# Patient Record
Sex: FEMALE | Race: BLACK OR AFRICAN AMERICAN
[De-identification: names, ages, dates, MRNs, and addresses within clinical notes are randomized per-mention and may not be internally consistent; named-entity substitution may affect disease eponyms.]

---

## 2017-10-20 ENCOUNTER — HOSPITAL ENCOUNTER (EMERGENCY)
Dept: HOSPITAL 47 - EC | Age: 27
Discharge: HOME | End: 2017-10-20
Payer: COMMERCIAL

## 2017-10-20 VITALS — SYSTOLIC BLOOD PRESSURE: 119 MMHG | HEART RATE: 93 BPM | RESPIRATION RATE: 18 BRPM | DIASTOLIC BLOOD PRESSURE: 89 MMHG

## 2017-10-20 VITALS — TEMPERATURE: 98.3 F

## 2017-10-20 DIAGNOSIS — R41.82: ICD-10-CM

## 2017-10-20 DIAGNOSIS — F10.239: ICD-10-CM

## 2017-10-20 DIAGNOSIS — F17.200: ICD-10-CM

## 2017-10-20 DIAGNOSIS — E86.0: Primary | ICD-10-CM

## 2017-10-20 LAB
ALP SERPL-CCNC: 59 U/L (ref 38–126)
ALT SERPL-CCNC: 92 U/L (ref 9–52)
ANION GAP SERPL CALC-SCNC: 16 MMOL/L
AST SERPL-CCNC: 46 U/L (ref 14–36)
BASOPHILS # BLD AUTO: 0 K/UL (ref 0–0.2)
BASOPHILS NFR BLD AUTO: 0 %
BUN SERPL-SCNC: 11 MG/DL (ref 7–17)
CALCIUM SPEC-MCNC: 9.6 MG/DL (ref 8.4–10.2)
CH: 30.3
CHCM: 31.7
CHLORIDE SERPL-SCNC: 108 MMOL/L (ref 98–107)
CO2 SERPL-SCNC: 21 MMOL/L (ref 22–30)
EOSINOPHIL # BLD AUTO: 0.2 K/UL (ref 0–0.7)
EOSINOPHIL NFR BLD AUTO: 1 %
ERYTHROCYTE [DISTWIDTH] IN BLOOD BY AUTOMATED COUNT: 4.33 M/UL (ref 3.8–5.4)
ERYTHROCYTE [DISTWIDTH] IN BLOOD: 13.5 % (ref 11.5–15.5)
GLUCOSE SERPL-MCNC: 93 MG/DL (ref 74–99)
HCT VFR BLD AUTO: 41.5 % (ref 34–46)
HDW: 2.09
HGB BLD-MCNC: 13.1 GM/DL (ref 11.4–16)
LUC NFR BLD AUTO: 1 %
LYMPHOCYTES # SPEC AUTO: 2.1 K/UL (ref 1–4.8)
LYMPHOCYTES NFR SPEC AUTO: 16 %
MAGNESIUM SPEC-SCNC: 1.7 MG/DL (ref 1.6–2.3)
MCH RBC QN AUTO: 30.2 PG (ref 25–35)
MCHC RBC AUTO-ENTMCNC: 31.5 G/DL (ref 31–37)
MCV RBC AUTO: 95.9 FL (ref 80–100)
MONOCYTES # BLD AUTO: 0.5 K/UL (ref 0–1)
MONOCYTES NFR BLD AUTO: 4 %
NEUTROPHILS # BLD AUTO: 10.1 K/UL (ref 1.3–7.7)
NEUTROPHILS NFR BLD AUTO: 78 %
NON-AFRICAN AMERICAN GFR(MDRD): >60
PHOSPHATE SERPL-MCNC: 4.3 MG/DL (ref 2.5–4.5)
POTASSIUM SERPL-SCNC: 4.5 MMOL/L (ref 3.5–5.1)
PROT SERPL-MCNC: 8 G/DL (ref 6.3–8.2)
SODIUM SERPL-SCNC: 145 MMOL/L (ref 137–145)
WBC # BLD AUTO: 0.11 10*3/UL
WBC # BLD AUTO: 12.9 K/UL (ref 3.8–10.6)
WBC (PEROX): 13.04

## 2017-10-20 PROCEDURE — 93005 ELECTROCARDIOGRAM TRACING: CPT

## 2017-10-20 PROCEDURE — 96374 THER/PROPH/DIAG INJ IV PUSH: CPT

## 2017-10-20 PROCEDURE — 80320 DRUG SCREEN QUANTALCOHOLS: CPT

## 2017-10-20 PROCEDURE — 84703 CHORIONIC GONADOTROPIN ASSAY: CPT

## 2017-10-20 PROCEDURE — 83735 ASSAY OF MAGNESIUM: CPT

## 2017-10-20 PROCEDURE — 96375 TX/PRO/DX INJ NEW DRUG ADDON: CPT

## 2017-10-20 PROCEDURE — 84100 ASSAY OF PHOSPHORUS: CPT

## 2017-10-20 PROCEDURE — 85025 COMPLETE CBC W/AUTO DIFF WBC: CPT

## 2017-10-20 PROCEDURE — 36415 COLL VENOUS BLD VENIPUNCTURE: CPT

## 2017-10-20 PROCEDURE — 96361 HYDRATE IV INFUSION ADD-ON: CPT

## 2017-10-20 PROCEDURE — 99284 EMERGENCY DEPT VISIT MOD MDM: CPT

## 2017-10-20 PROCEDURE — 80053 COMPREHEN METABOLIC PANEL: CPT

## 2017-10-20 NOTE — ED
General Adult HPI





- General


Chief complaint: Syncope


Stated complaint: Fall


Time Seen by Provider: 10/20/17 10:34


Source: RN/MD, RN notes reviewed, old records reviewed


Mode of arrival: wheelchair


Limitations: altered mental status





- History of Present Illness


Initial comments: 





This is a 27-year-old female to the ER for evaluation today.  Patient presents 

with her mother today for evaluation after a syncopal event.  Patient has no 

medical history takes no medications.  Patient states he feels very weak, 

lightheaded.  Patient does admit to drinking last night, more than usual.  No 

nausea vomiting.  Patient states she neck keep anything down today.  No recent 

episodes of illness no diarrhea no chest pain or shortness of breath no 

abdominal pain.  No headaches.  Patient was visiting a friend and OB and while 

in the room passed out.  Witnessed event, no change of color.





- Related Data


 Home Medications











 Medication  Instructions  Recorded  Confirmed


 


Aspirin-Acet-Caff [id number] 2 tab PO Q4HR PRN 10/20/17 10/20/17





[Excedrin]   











 Allergies











Allergy/AdvReac Type Severity Reaction Status Date / Time


 


No Known Allergies Allergy   Verified 10/18/15 07:50














Review of Systems


ROS Statement: 


Those systems with pertinent positive or pertinent negative responses have been 

documented in the HPI.





ROS Other: All systems not noted in ROS Statement are negative.





Past Medical History


Past Medical History: No Reported History


Additional Past Medical History / Comment(s): Pericarditis; Kidney Stones


History of Any Multi-Drug Resistant Organisms: None Reported


Past Surgical History: No Surgical Hx Reported


Past Psychological History: Anxiety, Depression


Smoking Status: Current every day smoker


Past Alcohol Use History: Occasional


Past Drug Use History: None Reported





General Exam


Limitations: altered mental status


General appearance: alert, in no apparent distress


Head exam: Present: atraumatic, normocephalic, normal inspection


Eye exam: Present: normal appearance, PERRL, EOMI.  Absent: scleral icterus, 

conjunctival injection, periorbital swelling


ENT exam: Present: normal exam, mucous membranes moist


Neck exam: Present: normal inspection.  Absent: tenderness, meningismus, 

lymphadenopathy


Respiratory exam: Present: normal lung sounds bilaterally.  Absent: respiratory 

distress, wheezes, rales, rhonchi, stridor


Cardiovascular Exam: Present: regular rate, normal rhythm, normal heart sounds.

  Absent: systolic murmur, diastolic murmur, rubs, gallop, clicks


GI/Abdominal exam: Present: soft, normal bowel sounds.  Absent: distended, 

tenderness, guarding, rebound, rigid


Extremities exam: Present: normal inspection, full ROM, normal capillary 

refill.  Absent: tenderness, pedal edema, joint swelling, calf tenderness


Back exam: Present: normal inspection


Neurological exam: Present: alert, oriented X3, CN II-XII intact


Psychiatric exam: Present: normal affect, normal mood


Skin exam: Present: warm, dry, intact, normal color.  Absent: rash





Course


 Vital Signs











  10/20/17





  10:20


 


Temperature 98.3 F


 


Pulse Rate 92


 


Respiratory 18





Rate 


 


Blood Pressure 112/68


 


O2 Sat by Pulse 99





Oximetry 














- Reevaluation(s)


Reevaluation #1: 





10/20/17 11:07


Patient is feeling much better with IV fluid resuscitation





Medical Decision Making





- Medical Decision Making





27 female to the ER with vasovagal syncopal event, secondary dehydration and 

alcohol intoxication from the night before.  Patient's labwork is normal.  

Patient is doing better patient can be discharged home





Disposition


Clinical Impression: 


 Vasovagal syncope, Dehydration, Alcohol withdrawal





Disposition: HOME SELF-CARE


Condition: Good


Instructions:  Syncope (ED)


Referrals: 


Bennett Saucedo MD [Primary Care Provider] - 1-2 days

## 2019-07-28 ENCOUNTER — HOSPITAL ENCOUNTER (EMERGENCY)
Dept: HOSPITAL 47 - EC | Age: 29
Discharge: HOME | End: 2019-07-28
Payer: COMMERCIAL

## 2019-07-28 VITALS — RESPIRATION RATE: 20 BRPM | HEART RATE: 82 BPM | SYSTOLIC BLOOD PRESSURE: 130 MMHG | DIASTOLIC BLOOD PRESSURE: 83 MMHG

## 2019-07-28 VITALS — TEMPERATURE: 98.1 F

## 2019-07-28 DIAGNOSIS — Z32.02: ICD-10-CM

## 2019-07-28 DIAGNOSIS — F17.200: ICD-10-CM

## 2019-07-28 DIAGNOSIS — R51: Primary | ICD-10-CM

## 2019-07-28 LAB
ALBUMIN SERPL-MCNC: 4.3 G/DL (ref 3.5–5)
ALP SERPL-CCNC: 43 U/L (ref 38–126)
ALT SERPL-CCNC: 29 U/L (ref 9–52)
ANION GAP SERPL CALC-SCNC: 6 MMOL/L
AST SERPL-CCNC: 24 U/L (ref 14–36)
BASOPHILS # BLD AUTO: 0 K/UL (ref 0–0.2)
BASOPHILS NFR BLD AUTO: 0 %
BUN SERPL-SCNC: 14 MG/DL (ref 7–17)
CALCIUM SPEC-MCNC: 9.5 MG/DL (ref 8.4–10.2)
CHLORIDE SERPL-SCNC: 105 MMOL/L (ref 98–107)
CO2 SERPL-SCNC: 27 MMOL/L (ref 22–30)
EOSINOPHIL # BLD AUTO: 0.1 K/UL (ref 0–0.7)
EOSINOPHIL NFR BLD AUTO: 1 %
ERYTHROCYTE [DISTWIDTH] IN BLOOD BY AUTOMATED COUNT: 4.07 M/UL (ref 3.8–5.4)
ERYTHROCYTE [DISTWIDTH] IN BLOOD: 14.8 % (ref 11.5–15.5)
GLUCOSE SERPL-MCNC: 88 MG/DL (ref 74–99)
HCT VFR BLD AUTO: 38.4 % (ref 34–46)
HGB BLD-MCNC: 12.5 GM/DL (ref 11.4–16)
LYMPHOCYTES # SPEC AUTO: 1.8 K/UL (ref 1–4.8)
LYMPHOCYTES NFR SPEC AUTO: 22 %
MCH RBC QN AUTO: 30.7 PG (ref 25–35)
MCHC RBC AUTO-ENTMCNC: 32.6 G/DL (ref 31–37)
MCV RBC AUTO: 94.2 FL (ref 80–100)
MONOCYTES # BLD AUTO: 0.4 K/UL (ref 0–1)
MONOCYTES NFR BLD AUTO: 5 %
NEUTROPHILS # BLD AUTO: 5.9 K/UL (ref 1.3–7.7)
NEUTROPHILS NFR BLD AUTO: 70 %
PH UR: 5 [PH] (ref 5–8)
PLATELET # BLD AUTO: 209 K/UL (ref 150–450)
POTASSIUM SERPL-SCNC: 4.6 MMOL/L (ref 3.5–5.1)
PROT SERPL-MCNC: 7.4 G/DL (ref 6.3–8.2)
RBC UR QL: 2 /HPF (ref 0–5)
SODIUM SERPL-SCNC: 138 MMOL/L (ref 137–145)
SP GR UR: 1 (ref 1–1.03)
SQUAMOUS UR QL AUTO: 10 /HPF (ref 0–4)
UROBILINOGEN UR QL STRIP: <2 MG/DL (ref ?–2)
WBC # BLD AUTO: 8.4 K/UL (ref 3.8–10.6)
WBC #/AREA URNS HPF: 4 /HPF (ref 0–5)

## 2019-07-28 PROCEDURE — 81001 URINALYSIS AUTO W/SCOPE: CPT

## 2019-07-28 PROCEDURE — 96374 THER/PROPH/DIAG INJ IV PUSH: CPT

## 2019-07-28 PROCEDURE — 70450 CT HEAD/BRAIN W/O DYE: CPT

## 2019-07-28 PROCEDURE — 96361 HYDRATE IV INFUSION ADD-ON: CPT

## 2019-07-28 PROCEDURE — 85025 COMPLETE CBC W/AUTO DIFF WBC: CPT

## 2019-07-28 PROCEDURE — 99284 EMERGENCY DEPT VISIT MOD MDM: CPT

## 2019-07-28 PROCEDURE — 36415 COLL VENOUS BLD VENIPUNCTURE: CPT

## 2019-07-28 PROCEDURE — 80053 COMPREHEN METABOLIC PANEL: CPT

## 2019-07-28 PROCEDURE — 81025 URINE PREGNANCY TEST: CPT

## 2019-07-28 NOTE — CT
EXAMINATION TYPE: CT brain wo con

 

DATE OF EXAM: 7/28/2019

 

COMPARISON:

 

HISTORY: Pt had sharp light in both eyes, and vision changes, nausea, headache

 

CT DLP: 1111.4 mGycm.  Automated Exposure Control for Dose Reduction was Utilized.

 

 

TECHNIQUE: CT scan of the head is performed without contrast.

 

FINDINGS:   There is no acute intracranial hemorrhage, mass effect, or midline shift identified.  The
 ventricles and sulci are within normal limits in size.  The globes are intact and the visualized sin
uses are remarkable for minimal inflammatory change in the ethmoid air cells.. Cerumen present within
 the external auditory canals.

 

IMPRESSION:  No acute intracranial hemorrhage, mass effect, or midline shift is seen.

## 2019-07-28 NOTE — ED
General Adult HPI





- General


Chief complaint: Recheck/Abnormal Lab/Rx


Stated complaint: Blured vision and seeing flashes


Time Seen by Provider: 07/28/19 17:05


Source: patient, RN notes reviewed


Mode of arrival: ambulatory


Limitations: no limitations





- History of Present Illness


Initial comments: 





29-year-old female presents to the emergency department for a chief complaint of

visual changes.  Patient states she was at work and suddenly she started to have

flashing vision that lasted for several minutes and then her vision was blurry. 

States that she then developed a headache.  States her vision is back to normal 

at this time but she does still have a headache.  Denies any neck pain.  Denies 

any fevers or chills.  Patient denies any chest pain or shortness of breath.  

Denies any lightheadedness.Patient has no other complaints at this time includ

ing shortness of breath, chest pain, abdominal pain, nausea or vomiting,.





- Related Data


                                Home Medications











 Medication  Instructions  Recorded  Confirmed


 


Aspirin-Acet-Caff [id number] 2 tab PO Q4HR PRN 10/20/17 10/20/17





[Excedrin]   











                                    Allergies











Allergy/AdvReac Type Severity Reaction Status Date / Time


 


No Known Allergies Allergy   Verified 07/28/19 16:48














Review of Systems


ROS Statement: 


Those systems with pertinent positive or pertinent negative responses have been 

documented in the HPI.





ROS Other: All systems not noted in ROS Statement are negative.





Past Medical History


Past Medical History: No Reported History


Additional Past Medical History / Comment(s): Pericarditis; Kidney Stones


History of Any Multi-Drug Resistant Organisms: None Reported


Past Surgical History: No Surgical Hx Reported


Past Psychological History: Anxiety, Depression


Smoking Status: Current every day smoker


Past Alcohol Use History: Occasional


Past Drug Use History: None Reported





General Exam


Limitations: no limitations


General appearance: alert, in no apparent distress


Head exam: Present: atraumatic, normocephalic, normal inspection


Eye exam: Present: normal appearance, PERRL, EOMI.  Absent: scleral icterus, 

conjunctival injection, periorbital swelling


ENT exam: Present: normal exam, normal oropharynx, mucous membranes moist, TM's 

normal bilaterally, normal external ear exam


Neck exam: Present: normal inspection, full ROM.  Absent: tenderness, 

meningismus, lymphadenopathy


Respiratory exam: Present: normal lung sounds bilaterally.  Absent: respiratory 

distress, wheezes, rales, rhonchi, stridor


Cardiovascular Exam: Present: regular rate, normal rhythm, normal heart sounds. 

 Absent: systolic murmur, diastolic murmur, rubs, gallop, clicks


GI/Abdominal exam: Present: soft, normal bowel sounds.  Absent: distended, 

tenderness, guarding, rebound, rigid


Neurological exam: Present: alert, oriented X3, CN II-XII intact, normal gait, 

other (GCS 15)


  ** Expanded


Patient oriented to: Present: person, place, time


Speech: Present: fluid speech


Cranial nerves: EOM's Intact: Normal, Tongue Deviation: Normal, Nystagmus: 

Normal, Facial Sensation: Normal


Cerebellar function: Finger to Nose: Normal


Upper motor neuron: Pronator Drift: Normal


Sensory exam: Upper Extremity Light Touch: Normal, Upper Extremity Pin Prick: 

Normal, Lower Extremity Light Touch: Normal, Lower Extremity Pin Prick: Normal


Motor strength exam: RUE: 5, LUE: 5, RLE: 5, LLE: 5


Eye Response: (4) open spontaneously


Motor Response: (6) obeys commands


Verbal Response: (5) oriented


Britt Total: 15


Psychiatric exam: Present: normal affect, normal mood





Course


                                   Vital Signs











  07/28/19





  16:45


 


Temperature 98.1 F


 


Pulse Rate 88


 


Respiratory 18





Rate 


 


Blood Pressure 140/93


 


O2 Sat by Pulse 100





Oximetry 














Medical Decision Making





- Medical Decision Making





29-year-old female with a past medical history of pericarditis, kidney stones 

presents to the emergency department for a chief complaint of visual changes.  

Patient states she was at work when suddenly she started to have flashing vision

 followed by blurry vision.  Patient states this subsided but then she got a 

headache.  She states this all happened less than 2 hours ago.  Patient denies 

any neck pain.  Denies any fevers or chills.  On exam there are no focal 

neurologic deficits.  Patient is ambulatory without difficulty.  Patient denying

 any visual changes at this time.  CBC and CMP are unremarkable.  Urine is 

positive for blood however patient is currently on her period.  CT brain shows 

no acute intracranial hemorrhage, mass effect, or midline shift.  Patient 

reevaluated and given Toradol.  Still stating she has a mild headache but vision

 is still completely normal.  Patient likely had a headache with aura.  Patient 

will be discharged home to follow up with primary care.  She will return here if

 she has any worsening symptoms which were discussed with her.





- Lab Data


Result diagrams: 


                                 07/28/19 18:02





                                 07/28/19 18:02


                                   Lab Results











  07/28/19 07/28/19 07/28/19 Range/Units





  18:02 18:02 18:02 


 


WBC  8.4    (3.8-10.6)  k/uL


 


RBC  4.07    (3.80-5.40)  m/uL


 


Hgb  12.5    (11.4-16.0)  gm/dL


 


Hct  38.4    (34.0-46.0)  %


 


MCV  94.2    (80.0-100.0)  fL


 


MCH  30.7    (25.0-35.0)  pg


 


MCHC  32.6    (31.0-37.0)  g/dL


 


RDW  14.8    (11.5-15.5)  %


 


Plt Count  209    (150-450)  k/uL


 


Neutrophils %  70    %


 


Lymphocytes %  22    %


 


Monocytes %  5    %


 


Eosinophils %  1    %


 


Basophils %  0    %


 


Neutrophils #  5.9    (1.3-7.7)  k/uL


 


Lymphocytes #  1.8    (1.0-4.8)  k/uL


 


Monocytes #  0.4    (0-1.0)  k/uL


 


Eosinophils #  0.1    (0-0.7)  k/uL


 


Basophils #  0.0    (0-0.2)  k/uL


 


Sodium   138   (137-145)  mmol/L


 


Potassium   4.6   (3.5-5.1)  mmol/L


 


Chloride   105   ()  mmol/L


 


Carbon Dioxide   27   (22-30)  mmol/L


 


Anion Gap   6   mmol/L


 


BUN   14   (7-17)  mg/dL


 


Creatinine   0.69   (0.52-1.04)  mg/dL


 


Est GFR (CKD-EPI)AfAm   >90   (>60 ml/min/1.73 sqM)  


 


Est GFR (CKD-EPI)NonAf   >90   (>60 ml/min/1.73 sqM)  


 


Glucose   88   (74-99)  mg/dL


 


Calcium   9.5   (8.4-10.2)  mg/dL


 


Total Bilirubin   0.3   (0.2-1.3)  mg/dL


 


AST   24   (14-36)  U/L


 


ALT   29   (9-52)  U/L


 


Alkaline Phosphatase   43   ()  U/L


 


Total Protein   7.4   (6.3-8.2)  g/dL


 


Albumin   4.3   (3.5-5.0)  g/dL


 


Urine Color     


 


Urine Appearance     (Clear)  


 


Urine pH     (5.0-8.0)  


 


Ur Specific Gravity     (1.001-1.035)  


 


Urine Protein     (Negative)  


 


Urine Glucose (UA)     (Negative)  


 


Urine Ketones     (Negative)  


 


Urine Blood     (Negative)  


 


Urine Nitrite     (Negative)  


 


Urine Bilirubin     (Negative)  


 


Urine Urobilinogen     (<2.0)  mg/dL


 


Ur Leukocyte Esterase     (Negative)  


 


Urine RBC     (0-5)  /hpf


 


Urine WBC     (0-5)  /hpf


 


Ur Squamous Epith Cells     (0-4)  /hpf


 


Amorphous Sediment     (None)  /hpf


 


Urine Bacteria     (None)  /hpf


 


Urine HCG, Qual    Not Detected  (Not Detectd)  














  07/28/19 Range/Units





  18:02 


 


WBC   (3.8-10.6)  k/uL


 


RBC   (3.80-5.40)  m/uL


 


Hgb   (11.4-16.0)  gm/dL


 


Hct   (34.0-46.0)  %


 


MCV   (80.0-100.0)  fL


 


MCH   (25.0-35.0)  pg


 


MCHC   (31.0-37.0)  g/dL


 


RDW   (11.5-15.5)  %


 


Plt Count   (150-450)  k/uL


 


Neutrophils %   %


 


Lymphocytes %   %


 


Monocytes %   %


 


Eosinophils %   %


 


Basophils %   %


 


Neutrophils #   (1.3-7.7)  k/uL


 


Lymphocytes #   (1.0-4.8)  k/uL


 


Monocytes #   (0-1.0)  k/uL


 


Eosinophils #   (0-0.7)  k/uL


 


Basophils #   (0-0.2)  k/uL


 


Sodium   (137-145)  mmol/L


 


Potassium   (3.5-5.1)  mmol/L


 


Chloride   ()  mmol/L


 


Carbon Dioxide   (22-30)  mmol/L


 


Anion Gap   mmol/L


 


BUN   (7-17)  mg/dL


 


Creatinine   (0.52-1.04)  mg/dL


 


Est GFR (CKD-EPI)AfAm   (>60 ml/min/1.73 sqM)  


 


Est GFR (CKD-EPI)NonAf   (>60 ml/min/1.73 sqM)  


 


Glucose   (74-99)  mg/dL


 


Calcium   (8.4-10.2)  mg/dL


 


Total Bilirubin   (0.2-1.3)  mg/dL


 


AST   (14-36)  U/L


 


ALT   (9-52)  U/L


 


Alkaline Phosphatase   ()  U/L


 


Total Protein   (6.3-8.2)  g/dL


 


Albumin   (3.5-5.0)  g/dL


 


Urine Color  Light Yellow  


 


Urine Appearance  Clear  (Clear)  


 


Urine pH  5.0  (5.0-8.0)  


 


Ur Specific Gravity  1.005  (1.001-1.035)  


 


Urine Protein  Negative  (Negative)  


 


Urine Glucose (UA)  Negative  (Negative)  


 


Urine Ketones  Negative  (Negative)  


 


Urine Blood  Large H  (Negative)  


 


Urine Nitrite  Negative  (Negative)  


 


Urine Bilirubin  Negative  (Negative)  


 


Urine Urobilinogen  <2.0  (<2.0)  mg/dL


 


Ur Leukocyte Esterase  Negative  (Negative)  


 


Urine RBC  2  (0-5)  /hpf


 


Urine WBC  4  (0-5)  /hpf


 


Ur Squamous Epith Cells  10 H  (0-4)  /hpf


 


Amorphous Sediment  Rare H  (None)  /hpf


 


Urine Bacteria  Rare H  (None)  /hpf


 


Urine HCG, Qual   (Not Detectd)  














Disposition


Clinical Impression: 


 Headache





Disposition: HOME SELF-CARE


Condition: Good


Instructions (If sedation given, give patient instructions):  General Headache 

(ED)


Additional Instructions: 


Please follow up with primary care in 1-2 days.  Please return to the emergency 

department if you have any worsening symptoms.


Is patient prescribed a controlled substance at d/c from ED?: No


Referrals: 


Mary Hopson MD [STAFF PHYSICIAN] - 1-2 days


Time of Disposition: 19:25

## 2020-07-17 ENCOUNTER — HOSPITAL ENCOUNTER (EMERGENCY)
Dept: HOSPITAL 47 - EC | Age: 30
Discharge: HOME | End: 2020-07-17
Payer: MEDICAID

## 2020-07-17 VITALS — RESPIRATION RATE: 18 BRPM

## 2020-07-17 VITALS — DIASTOLIC BLOOD PRESSURE: 60 MMHG | SYSTOLIC BLOOD PRESSURE: 117 MMHG | HEART RATE: 72 BPM | TEMPERATURE: 98.4 F

## 2020-07-17 DIAGNOSIS — Z87.891: ICD-10-CM

## 2020-07-17 DIAGNOSIS — Z3A.00: ICD-10-CM

## 2020-07-17 DIAGNOSIS — O20.9: Primary | ICD-10-CM

## 2020-07-17 DIAGNOSIS — O34.10: ICD-10-CM

## 2020-07-17 LAB
ALBUMIN SERPL-MCNC: 4.3 G/DL (ref 3.5–5)
ALP SERPL-CCNC: 38 U/L (ref 38–126)
ALT SERPL-CCNC: 22 U/L (ref 4–34)
ANION GAP SERPL CALC-SCNC: 7 MMOL/L
AST SERPL-CCNC: 23 U/L (ref 14–36)
BASOPHILS # BLD AUTO: 0 K/UL (ref 0–0.2)
BASOPHILS NFR BLD AUTO: 0 %
BUN SERPL-SCNC: 11 MG/DL (ref 7–17)
CALCIUM SPEC-MCNC: 9.4 MG/DL (ref 8.4–10.2)
CHLORIDE SERPL-SCNC: 104 MMOL/L (ref 98–107)
CO2 SERPL-SCNC: 24 MMOL/L (ref 22–30)
EOSINOPHIL # BLD AUTO: 0.1 K/UL (ref 0–0.7)
EOSINOPHIL NFR BLD AUTO: 1 %
ERYTHROCYTE [DISTWIDTH] IN BLOOD BY AUTOMATED COUNT: 4.13 M/UL (ref 3.8–5.4)
ERYTHROCYTE [DISTWIDTH] IN BLOOD: 13.2 % (ref 11.5–15.5)
GLUCOSE SERPL-MCNC: 87 MG/DL (ref 74–99)
HCG SERPL-MCNC: 7511.1 MIU/ML
HCT VFR BLD AUTO: 39 % (ref 34–46)
HGB BLD-MCNC: 12.6 GM/DL (ref 11.4–16)
LYMPHOCYTES # SPEC AUTO: 1.6 K/UL (ref 1–4.8)
LYMPHOCYTES NFR SPEC AUTO: 20 %
MCH RBC QN AUTO: 30.6 PG (ref 25–35)
MCHC RBC AUTO-ENTMCNC: 32.4 G/DL (ref 31–37)
MCV RBC AUTO: 94.5 FL (ref 80–100)
MONOCYTES # BLD AUTO: 0.5 K/UL (ref 0–1)
MONOCYTES NFR BLD AUTO: 6 %
NEUTROPHILS # BLD AUTO: 5.5 K/UL (ref 1.3–7.7)
NEUTROPHILS NFR BLD AUTO: 70 %
PH UR: 5.5 [PH] (ref 5–8)
PLATELET # BLD AUTO: 231 K/UL (ref 150–450)
POTASSIUM SERPL-SCNC: 5 MMOL/L (ref 3.5–5.1)
PROT SERPL-MCNC: 7 G/DL (ref 6.3–8.2)
RBC UR QL: 1 /HPF (ref 0–5)
SODIUM SERPL-SCNC: 135 MMOL/L (ref 137–145)
SP GR UR: 1.02 (ref 1–1.03)
SQUAMOUS UR QL AUTO: 4 /HPF (ref 0–4)
UROBILINOGEN UR QL STRIP: <2 MG/DL (ref ?–2)
WBC # BLD AUTO: 7.8 K/UL (ref 3.8–10.6)
WBC # UR AUTO: 2 /HPF (ref 0–5)

## 2020-07-17 PROCEDURE — 96372 THER/PROPH/DIAG INJ SC/IM: CPT

## 2020-07-17 PROCEDURE — 76801 OB US < 14 WKS SINGLE FETUS: CPT

## 2020-07-17 PROCEDURE — 99284 EMERGENCY DEPT VISIT MOD MDM: CPT

## 2020-07-17 PROCEDURE — 86901 BLOOD TYPING SEROLOGIC RH(D): CPT

## 2020-07-17 PROCEDURE — 84702 CHORIONIC GONADOTROPIN TEST: CPT

## 2020-07-17 PROCEDURE — 86850 RBC ANTIBODY SCREEN: CPT

## 2020-07-17 PROCEDURE — 36415 COLL VENOUS BLD VENIPUNCTURE: CPT

## 2020-07-17 PROCEDURE — 80053 COMPREHEN METABOLIC PANEL: CPT

## 2020-07-17 PROCEDURE — 76817 TRANSVAGINAL US OBSTETRIC: CPT

## 2020-07-17 PROCEDURE — 86900 BLOOD TYPING SEROLOGIC ABO: CPT

## 2020-07-17 PROCEDURE — 81001 URINALYSIS AUTO W/SCOPE: CPT

## 2020-07-17 PROCEDURE — 85025 COMPLETE CBC W/AUTO DIFF WBC: CPT

## 2020-07-17 NOTE — ED
Abdominal Pain HPI





- General


Chief Complaint: Abdominal Pain


Stated Complaint: 6 weeks pregnant-spotting


Time Seen by Provider: 20 12:54


Source: patient


Mode of arrival: ambulatory


Limitations: no limitations





- History of Present Illness


Initial Comments: 





Patient is a 29-year-old female presenting to the emergency Department with 

complaints of vaginal spotting as well as lower abdominal cramping 2 days.  

Patient recently found out she was pregnant.  She states she has been having 

spotting over the past 2 weeks which seems to be increasing over the past 2 

days.  She is also been having lower abdominal cramping over the last 2 days.  

She is .  She does not follow with an OB/GYN yet.  She denies any fever, 

chills.  She does admit to some mild nausea, no vomiting or diarrhea.  She is on

no medications at this time.  She has no further complaints at this time.  Upon 

arrival to the ER, her vital signs are stable.





- Related Data


                                Home Medications











 Medication  Instructions  Recorded  Confirmed


 


Acetaminophen Tab [Tylenol Tab] 1,000 mg PO Q6H PRN 20


 


Ibuprofen [Motrin Ib] 400 mg PO Q6H PRN 20











                                    Allergies











Allergy/AdvReac Type Severity Reaction Status Date / Time


 


No Known Allergies Allergy   Verified 20 15:27














Review of Systems


ROS Statement: 


Those systems with pertinent positive or pertinent negative responses have been 

documented in the HPI.





ROS Other: All systems not noted in ROS Statement are negative.





Past Medical History


Past Medical History: No Reported History


Additional Past Medical History / Comment(s): Pericarditis; Kidney Stones


History of Any Multi-Drug Resistant Organisms: None Reported


Past Surgical History: No Surgical Hx Reported


Past Psychological History: Anxiety, Depression


Smoking Status: Former smoker


Past Alcohol Use History: Occasional


Past Drug Use History: None Reported





General Exam





- General Exam Comments


Initial Comments: 





GENERAL: 


Well-appearing, well-nourished and in no acute distress.





HEAD: 


Atraumatic, normocephalic.





EYES:


Pupils equal round and reactive to light, extraocular movements intact, sclera 

anicteric, conjunctiva are normal.





ENT: 


TMs normal, nares patent, oropharynx clear without exudates.  Moist mucous 

membranes.





NECK: 


Normal range of motion, supple without lymphadenopathy or JVD.





LUNGS:


 Breath sounds clear to auscultation bilaterally and equal.  No wheezes rales or

rhonchi.





HEART:


Regular rate and rhythm without murmurs, rubs or gallops.





ABDOMEN: 


Soft, nontender, normoactive bowel sounds.  No guarding, no rebound.  No masses 

appreciated.





EXTREMITIES: 


Normal range of motion, no pitting or edema.  No clubbing or cyanosis.





NEUROLOGICAL: 


Cranial nerves II through XII grossly intact.  Normal speech, normal gait.





PSYCH:


Normal mood, normal affect.





SKIN:


 Warm, Dry, normal turgor, no rashes or lesions noted.


Limitations: no limitations


External exam: Present: normal external exam


Speculum exam: Present: vaginal bleeding.  Absent: vaginal discharge, foreign 

body


By manual exam: Present: normal by manual exam





Course


                                   Vital Signs











  20





  12:40 13:51


 


Temperature 98.7 F 


 


Pulse Rate 97 82


 


Respiratory 20 18





Rate  


 


Blood Pressure 144/93 126/84


 


O2 Sat by Pulse 99 98





Oximetry  














Medical Decision Making





- Medical Decision Making





Patient is a 29-year-old female here for vaginal spotting 2 weeks as well as 

abdominal cramping 2 days.  She recently found out she was pregnant.  .  

She does not follow with an OB/GYN.  Exam revealed a mild amount of blood in the

vaginal vault, no other abnormal findings.  Lab shows no acute findings, hCG 

[place] is 7500.  Urine shows no signs of infection.  Ultrasound shows intrauterine

gestational sac with a yolk sac present.  Fetal pole was not yet identified, too

early to date.  Uterine fibroid is also present.  Blood type to return as be 

negative.  Patient was administered Rhogam.  I did discuss these findings with 

the patient.  I stated that her bleeding could be from implantation versus 

threatened miscarriage.  I recommended following up with OB/GYN in the next few 

weeks.  Beta repeat in 48 hours. Patient is stable for discharge.  She is in 

agreement with this plan of care.  Return parameters were discussed with the 

patient she verbalized understanding.  Case discussed with Dr. Goodwin. 





- Lab Data


Result diagrams: 


                                 20 13:58





                                 20 13:58


                                   Lab Results











  20 Range/Units





  13:54 13:58 13:58 


 


WBC   7.8   (3.8-10.6)  k/uL


 


RBC   4.13   (3.80-5.40)  m/uL


 


Hgb   12.6   (11.4-16.0)  gm/dL


 


Hct   39.0   (34.0-46.0)  %


 


MCV   94.5   (80.0-100.0)  fL


 


MCH   30.6   (25.0-35.0)  pg


 


MCHC   32.4   (31.0-37.0)  g/dL


 


RDW   13.2   (11.5-15.5)  %


 


Plt Count   231   (150-450)  k/uL


 


Neutrophils %   70   %


 


Lymphocytes %   20   %


 


Monocytes %   6   %


 


Eosinophils %   1   %


 


Basophils %   0   %


 


Neutrophils #   5.5   (1.3-7.7)  k/uL


 


Lymphocytes #   1.6   (1.0-4.8)  k/uL


 


Monocytes #   0.5   (0-1.0)  k/uL


 


Eosinophils #   0.1   (0-0.7)  k/uL


 


Basophils #   0.0   (0-0.2)  k/uL


 


Sodium    135 L  (137-145)  mmol/L


 


Potassium    5.0  (3.5-5.1)  mmol/L


 


Chloride    104  ()  mmol/L


 


Carbon Dioxide    24  (22-30)  mmol/L


 


Anion Gap    7  mmol/L


 


BUN    11  (7-17)  mg/dL


 


Creatinine    0.63  (0.52-1.04)  mg/dL


 


Est GFR (CKD-EPI)AfAm    >90  (>60 ml/min/1.73 sqM)  


 


Est GFR (CKD-EPI)NonAf    >90  (>60 ml/min/1.73 sqM)  


 


Glucose    87  (74-99)  mg/dL


 


Calcium    9.4  (8.4-10.2)  mg/dL


 


Total Bilirubin    0.3  (0.2-1.3)  mg/dL


 


AST    23  (14-36)  U/L


 


ALT    22  (4-34)  U/L


 


Alkaline Phosphatase    38  ()  U/L


 


Total Protein    7.0  (6.3-8.2)  g/dL


 


Albumin    4.3  (3.5-5.0)  g/dL


 


HCG, Quant    7511.1  mIU/mL


 


Urine Color  Yellow    


 


Urine Appearance  Cloudy H    (Clear)  


 


Urine pH  5.5    (5.0-8.0)  


 


Ur Specific Gravity  1.018    (1.001-1.035)  


 


Urine Protein  Trace H    (Negative)  


 


Urine Glucose (UA)  Negative    (Negative)  


 


Urine Ketones  Negative    (Negative)  


 


Urine Blood  Moderate H    (Negative)  


 


Urine Nitrite  Negative    (Negative)  


 


Urine Bilirubin  Negative    (Negative)  


 


Urine Urobilinogen  <2.0    (<2.0)  mg/dL


 


Ur Leukocyte Esterase  Negative    (Negative)  


 


Urine RBC  1    (0-5)  /hpf


 


Urine WBC  2    (0-5)  /hpf


 


Ur Squamous Epith Cells  4    (0-4)  /hpf


 


Urine Bacteria  Rare H    (None)  /hpf


 


Urine Mucus  Rare H    (None)  /hpf


 


Blood Type     


 


Blood Type Confirm     


 


Blood Type Recheck     


 


Bld Type Recheck Status     


 


Antibody Screen     














  20 Range/Units





  13:58 15:41 


 


WBC    (3.8-10.6)  k/uL


 


RBC    (3.80-5.40)  m/uL


 


Hgb    (11.4-16.0)  gm/dL


 


Hct    (34.0-46.0)  %


 


MCV    (80.0-100.0)  fL


 


MCH    (25.0-35.0)  pg


 


MCHC    (31.0-37.0)  g/dL


 


RDW    (11.5-15.5)  %


 


Plt Count    (150-450)  k/uL


 


Neutrophils %    %


 


Lymphocytes %    %


 


Monocytes %    %


 


Eosinophils %    %


 


Basophils %    %


 


Neutrophils #    (1.3-7.7)  k/uL


 


Lymphocytes #    (1.0-4.8)  k/uL


 


Monocytes #    (0-1.0)  k/uL


 


Eosinophils #    (0-0.7)  k/uL


 


Basophils #    (0-0.2)  k/uL


 


Sodium    (137-145)  mmol/L


 


Potassium    (3.5-5.1)  mmol/L


 


Chloride    ()  mmol/L


 


Carbon Dioxide    (22-30)  mmol/L


 


Anion Gap    mmol/L


 


BUN    (7-17)  mg/dL


 


Creatinine    (0.52-1.04)  mg/dL


 


Est GFR (CKD-EPI)AfAm    (>60 ml/min/1.73 sqM)  


 


Est GFR (CKD-EPI)NonAf    (>60 ml/min/1.73 sqM)  


 


Glucose    (74-99)  mg/dL


 


Calcium    (8.4-10.2)  mg/dL


 


Total Bilirubin    (0.2-1.3)  mg/dL


 


AST    (14-36)  U/L


 


ALT    (4-34)  U/L


 


Alkaline Phosphatase    ()  U/L


 


Total Protein    (6.3-8.2)  g/dL


 


Albumin    (3.5-5.0)  g/dL


 


HCG, Quant    mIU/mL


 


Urine Color    


 


Urine Appearance    (Clear)  


 


Urine pH    (5.0-8.0)  


 


Ur Specific Gravity    (1.001-1.035)  


 


Urine Protein    (Negative)  


 


Urine Glucose (UA)    (Negative)  


 


Urine Ketones    (Negative)  


 


Urine Blood    (Negative)  


 


Urine Nitrite    (Negative)  


 


Urine Bilirubin    (Negative)  


 


Urine Urobilinogen    (<2.0)  mg/dL


 


Ur Leukocyte Esterase    (Negative)  


 


Urine RBC    (0-5)  /hpf


 


Urine WBC    (0-5)  /hpf


 


Ur Squamous Epith Cells    (0-4)  /hpf


 


Urine Bacteria    (None)  /hpf


 


Urine Mucus    (None)  /hpf


 


Blood Type  B Negative   


 


Blood Type Confirm   B Negative  


 


Blood Type Recheck  No Previous Record   


 


Bld Type Recheck Status  CABO Indicated   


 


Antibody Screen  NEGATIVE   














Disposition


Clinical Impression: 


 Vaginal bleeding affecting early pregnancy





Disposition: HOME SELF-CARE


Condition: Stable


Instructions (If sedation given, give patient instructions):  Threatened 

Miscarriage (ED)


Additional Instructions: 


Please return to the Emergency Department if symptoms worsen or any other c

oncerns.


Have a Beta HCG levels repeated in 48 hours.  


Follow-up with OB/GYN.


Is patient prescribed a controlled substance at d/c from ED?: No


Referrals: 


None,Stated [Primary Care Provider] - 1-2 days


Los Vasquez MD [STAFF PHYSICIAN] - 1-2 days

## 2020-07-17 NOTE — US
EXAMINATION TYPE: Transabdominal

 

DATE OF EXAM: 7/17/2020 2:40 PM

 

COMPARISON: NONE

 

CLINICAL HISTORY: bleeding, cramping. Spotting for 2 weeks, cramping

 

EXAM PERFORMED:  Transvaginal (TV) and Transabdominal (TA)

 

EXAM MEASUREMENTS:

 

GESTATIONAL AGE / DATING

 

Physician Established: Not yet established 

Dates by LMP: (5 weeks/0 days)  

** EDC: 03/19/21

Dates by First Scan:  No previous this is first scan 

Dates by Current Scan for: too small to accurately measure 

 

MATERNAL ANATOMY 

 

Uterus: 9.6 x 4.8 x 5.4cm

Right Ovary: 3.9 x 2.1 x 2.7cm

Left Ovary: 3.1 x 2.3 x 2.0cm

Post CDS / Adnexa: appears wnl

Presence of free fluid: no

Presence of corpus luteal cyst: yes, complex area right ovary = 2.7 x 2.0 x 1.9cm  

 

 

GESTATION / FETAL SURVEY

 

MSD: 0.9cm too small to accurately measure 

Yolk Sac (normal less than 6mm): 0.2cm

IUP:  no evidence of fetal pole at this time 

 

 

Date of LMP: 06/12/20

Beta HcG (if available): Not available at this time

 

Probable gestational sac and yolk sac within uterus. Unable to visualize fetal pole at this time.  Co
rpus luteum right ovary. Hypoechoic area posterior fundus of uterus = 2.2 x 1.4 x 2.2cm, possible fib
roid.  

 

 

 

IMPRESSION:

 

1. Intrauterine gestational sac with yolk sac present. Fetal pole not identified, too early to date b
y measurement.

2. Uterine fibroid.

## 2020-07-20 ENCOUNTER — HOSPITAL ENCOUNTER (OUTPATIENT)
Dept: HOSPITAL 47 - LABWHC1 | Age: 30
Discharge: HOME | End: 2020-07-20
Attending: PHYSICIAN ASSISTANT
Payer: MEDICAID

## 2020-07-20 DIAGNOSIS — O20.0: Primary | ICD-10-CM

## 2020-07-20 PROCEDURE — 36415 COLL VENOUS BLD VENIPUNCTURE: CPT

## 2020-07-20 PROCEDURE — 84702 CHORIONIC GONADOTROPIN TEST: CPT

## 2020-08-03 ENCOUNTER — HOSPITAL ENCOUNTER (OUTPATIENT)
Dept: HOSPITAL 47 - RADUSWWP | Age: 30
End: 2020-08-03
Attending: OBSTETRICS & GYNECOLOGY
Payer: MEDICAID

## 2020-08-03 DIAGNOSIS — Z3A.01: ICD-10-CM

## 2020-08-03 DIAGNOSIS — Z36.89: Primary | ICD-10-CM

## 2020-08-03 PROCEDURE — 76817 TRANSVAGINAL US OBSTETRIC: CPT

## 2020-08-03 PROCEDURE — 76801 OB US < 14 WKS SINGLE FETUS: CPT

## 2020-08-03 NOTE — US
EXAMINATION TYPE: Transabdominal

 

DATE OF EXAM: 8/3/2020 10:05 AM

 

COMPARISON: Prior ultrasound July 17, 2020

 

CLINICAL HISTORY: Z36 f/u previous abnormal. F/U to previous

 

EXAM PERFORMED:  Transvaginal (TV) and Transabdominal (TA)

 

EXAM MEASUREMENTS:

 

GESTATIONAL AGE / DATING

 

Physician Established: Not yet established (

Dates by LMP: (7 weeks/3 days)  

** EDC: 03/19/2021

Dates by First Scan:  Too early to see.  

Dates by Current Scan for: (7 weeks/2 days)  

** EDC: 03/20/2021

 

MATERNAL ANATOMY 

 

Uterus: 9.1 x 6.1 x 8.2 Fibroid seen 1.4 x 1.6 x 1.9 cm

Right Ovary: 3.3 x 1.9 x 2.5 

Left Ovary: Obscured by bowel gas.

Post CDS / Adnexa: wnl

Presence of free fluid: no

Presence of corpus luteal cyst: Yes right

Presence of subchorionic bleed: no

 

GESTATION / FETAL SURVEY

 

CRL: 11.33 cm (7 weeks/2 days)

Yolk Sac (normal less than 6mm): 2 mm

Heart Rate: 132 bpm

Rhythm:  Normal

IUP:  Viable IUP

 

Beta HcG (if available): Not available at this time

 

Interval successful progression with now visualization of gestational sac, yolk sac, and fetal pole. 
No free fluid in pelvic cul-de-sac. Small intramural fibroid noted. Right ovary is seen with peripher
al hypervascular 1.7 cm lesion felt to reflect a corpus luteal cyst. No suspicious extra ovarian adne
xal lesion.

 

 

 

IMPRESSION: Interval successful progression. Single live intrauterine gestation now confirmed. Mean c
rown-rump length 11.3 cm corresponding to 7 week 2 day old fetus.

## 2020-08-14 ENCOUNTER — HOSPITAL ENCOUNTER (OUTPATIENT)
Dept: HOSPITAL 47 - LABWHC1 | Age: 30
Discharge: HOME | End: 2020-08-14
Attending: OBSTETRICS & GYNECOLOGY
Payer: MEDICAID

## 2020-08-14 DIAGNOSIS — Z34.01: Primary | ICD-10-CM

## 2020-08-14 LAB
ERYTHROCYTE [DISTWIDTH] IN BLOOD BY AUTOMATED COUNT: 4.1 M/UL (ref 3.8–5.4)
ERYTHROCYTE [DISTWIDTH] IN BLOOD: 12.9 % (ref 11.5–15.5)
GLUCOSE SERPL-MCNC: 81 MG/DL (ref 70–110)
HCT VFR BLD AUTO: 38.7 % (ref 34–46)
HGB BLD-MCNC: 12.2 GM/DL (ref 11.4–16)
MCH RBC QN AUTO: 29.8 PG (ref 25–35)
MCHC RBC AUTO-ENTMCNC: 31.5 G/DL (ref 31–37)
MCV RBC AUTO: 94.5 FL (ref 80–100)
PLATELET # BLD AUTO: 248 K/UL (ref 150–450)
WBC # BLD AUTO: 11.7 K/UL (ref 3.8–10.6)

## 2020-08-14 PROCEDURE — 82565 ASSAY OF CREATININE: CPT

## 2020-08-14 PROCEDURE — 86880 COOMBS TEST DIRECT: CPT

## 2020-08-14 PROCEDURE — 86870 RBC ANTIBODY IDENTIFICATION: CPT

## 2020-08-14 PROCEDURE — 85027 COMPLETE CBC AUTOMATED: CPT

## 2020-08-14 PROCEDURE — 86850 RBC ANTIBODY SCREEN: CPT

## 2020-08-14 PROCEDURE — 87340 HEPATITIS B SURFACE AG IA: CPT

## 2020-08-14 PROCEDURE — 36415 COLL VENOUS BLD VENIPUNCTURE: CPT

## 2020-08-14 PROCEDURE — 86900 BLOOD TYPING SEROLOGIC ABO: CPT

## 2020-08-14 PROCEDURE — 82947 ASSAY GLUCOSE BLOOD QUANT: CPT

## 2020-08-14 PROCEDURE — 86762 RUBELLA ANTIBODY: CPT

## 2020-08-14 PROCEDURE — 86901 BLOOD TYPING SEROLOGIC RH(D): CPT

## 2020-08-14 PROCEDURE — 86780 TREPONEMA PALLIDUM: CPT

## 2020-08-14 PROCEDURE — 87390 HIV-1 AG IA: CPT

## 2020-09-15 ENCOUNTER — HOSPITAL ENCOUNTER (EMERGENCY)
Dept: HOSPITAL 47 - EC | Age: 30
Discharge: HOME | End: 2020-09-15
Payer: MEDICAID

## 2020-09-15 VITALS — DIASTOLIC BLOOD PRESSURE: 76 MMHG | SYSTOLIC BLOOD PRESSURE: 131 MMHG | HEART RATE: 90 BPM

## 2020-09-15 VITALS — TEMPERATURE: 98.7 F | RESPIRATION RATE: 18 BRPM

## 2020-09-15 DIAGNOSIS — M94.0: ICD-10-CM

## 2020-09-15 DIAGNOSIS — Z87.891: ICD-10-CM

## 2020-09-15 DIAGNOSIS — O99.89: Primary | ICD-10-CM

## 2020-09-15 DIAGNOSIS — Z3A.14: ICD-10-CM

## 2020-09-15 DIAGNOSIS — R07.89: ICD-10-CM

## 2020-09-15 LAB
ALBUMIN SERPL-MCNC: 4 G/DL (ref 3.5–5)
ALP SERPL-CCNC: 46 U/L (ref 38–126)
ALT SERPL-CCNC: 24 U/L (ref 4–34)
ANION GAP SERPL CALC-SCNC: 10 MMOL/L
APTT BLD: 23.4 SEC (ref 22–30)
AST SERPL-CCNC: 26 U/L (ref 14–36)
BASOPHILS # BLD AUTO: 0 K/UL (ref 0–0.2)
BASOPHILS NFR BLD AUTO: 0 %
BUN SERPL-SCNC: 15 MG/DL (ref 7–17)
CALCIUM SPEC-MCNC: 9.7 MG/DL (ref 8.4–10.2)
CHLORIDE SERPL-SCNC: 104 MMOL/L (ref 98–107)
CO2 SERPL-SCNC: 20 MMOL/L (ref 22–30)
EOSINOPHIL # BLD AUTO: 0.1 K/UL (ref 0–0.7)
EOSINOPHIL NFR BLD AUTO: 1 %
ERYTHROCYTE [DISTWIDTH] IN BLOOD BY AUTOMATED COUNT: 3.91 M/UL (ref 3.8–5.4)
ERYTHROCYTE [DISTWIDTH] IN BLOOD: 12.7 % (ref 11.5–15.5)
GLUCOSE SERPL-MCNC: 86 MG/DL (ref 74–99)
HCT VFR BLD AUTO: 35.8 % (ref 34–46)
HGB BLD-MCNC: 11.7 GM/DL (ref 11.4–16)
INR PPP: 0.9 (ref ?–1.2)
LYMPHOCYTES # SPEC AUTO: 2.1 K/UL (ref 1–4.8)
LYMPHOCYTES NFR SPEC AUTO: 13 %
MAGNESIUM SPEC-SCNC: 1.8 MG/DL (ref 1.6–2.3)
MCH RBC QN AUTO: 29.9 PG (ref 25–35)
MCHC RBC AUTO-ENTMCNC: 32.7 G/DL (ref 31–37)
MCV RBC AUTO: 91.6 FL (ref 80–100)
MONOCYTES # BLD AUTO: 0.7 K/UL (ref 0–1)
MONOCYTES NFR BLD AUTO: 4 %
NEUTROPHILS # BLD AUTO: 12.9 K/UL (ref 1.3–7.7)
NEUTROPHILS NFR BLD AUTO: 80 %
PLATELET # BLD AUTO: 212 K/UL (ref 150–450)
POTASSIUM SERPL-SCNC: 4.4 MMOL/L (ref 3.5–5.1)
PROT SERPL-MCNC: 7.1 G/DL (ref 6.3–8.2)
PT BLD: 9.3 SEC (ref 9–12)
SODIUM SERPL-SCNC: 134 MMOL/L (ref 137–145)
WBC # BLD AUTO: 16 K/UL (ref 3.8–10.6)

## 2020-09-15 PROCEDURE — 84484 ASSAY OF TROPONIN QUANT: CPT

## 2020-09-15 PROCEDURE — 85730 THROMBOPLASTIN TIME PARTIAL: CPT

## 2020-09-15 PROCEDURE — 86140 C-REACTIVE PROTEIN: CPT

## 2020-09-15 PROCEDURE — 83735 ASSAY OF MAGNESIUM: CPT

## 2020-09-15 PROCEDURE — 99285 EMERGENCY DEPT VISIT HI MDM: CPT

## 2020-09-15 PROCEDURE — 85610 PROTHROMBIN TIME: CPT

## 2020-09-15 PROCEDURE — 85652 RBC SED RATE AUTOMATED: CPT

## 2020-09-15 PROCEDURE — 80053 COMPREHEN METABOLIC PANEL: CPT

## 2020-09-15 PROCEDURE — 71046 X-RAY EXAM CHEST 2 VIEWS: CPT

## 2020-09-15 PROCEDURE — 36415 COLL VENOUS BLD VENIPUNCTURE: CPT

## 2020-09-15 PROCEDURE — 93005 ELECTROCARDIOGRAM TRACING: CPT

## 2020-09-15 PROCEDURE — 85025 COMPLETE CBC W/AUTO DIFF WBC: CPT

## 2020-09-15 NOTE — ED
Chest Pain HPI





- General


Chief Complaint: Chest Pain


Stated Complaint: Chest Pain, 14 Weeks Preg


Time Seen by Provider: 09/15/20 21:26


Source: patient


Mode of arrival: ambulatory


Limitations: no limitations





- History of Present Illness


Initial Comments: 





Patient is a 30-year-old female, , 14 week pregnant with history of 

pericarditis presenting to emergency Department with a chief complaint of chest 

pain.  Patient states her symptoms started about 3 days ago with intermittent 

episodes of midsternal chest discomfort and associated shortness of breath the 

last for several minutes.  Patient states immediately after deposition feels 

like "she has been running a marathon".  Patient states today her symptoms are 

happening almost throughout the whole day.  States the pain is actually worse 

whenever he is sitting up or standing but feels better when she is laying down. 

States this does not feel like her pericarditis.  Patient states it is more of 

discomfort in the midsternal chest region without any radiation.  Denies any 

coughing, hemoptysis, night sweats fever or chills.  Denies any nausea vomiting 

diarrhea.





- Related Data


                                Home Medications











 Medication  Instructions  Recorded  Confirmed


 


Acetaminophen Tab [Tylenol Tab] 1,000 mg PO Q6H PRN 20


 


Ibuprofen [Motrin Ib] 400 mg PO Q6H PRN 20











                                    Allergies











Allergy/AdvReac Type Severity Reaction Status Date / Time


 


No Known Allergies Allergy   Verified 09/15/20 21:10














Review of Systems


ROS Statement: 


Those systems with pertinent positive or pertinent negative responses have been 

documented in the HPI.





ROS Other: All systems not noted in ROS Statement are negative.





Past Medical History


Past Medical History: No Reported History


Additional Past Medical History / Comment(s): Pericarditis; Kidney Stones


History of Any Multi-Drug Resistant Organisms: None Reported


Past Surgical History: No Surgical Hx Reported


Past Psychological History: Anxiety, Depression


Smoking Status: Former smoker


Past Alcohol Use History: Occasional


Past Drug Use History: None Reported





General Exam


Limitations: no limitations


General appearance: alert, in no apparent distress, obese


Head exam: Present: atraumatic, normocephalic, normal inspection


Eye exam: Present: normal appearance, PERRL, EOMI


Pupils: Present: normal accommodation


ENT exam: Present: normal exam, normal oropharynx, mucous membranes moist, TM's 

normal bilaterally, normal external ear exam


Neck exam: Present: normal inspection, full ROM.  Absent: tenderness


Respiratory exam: Present: normal lung sounds bilaterally, chest wall tenderness

(Reproducible midsternal chest pain).  Absent: respiratory distress, wheezes, 

rales, accessory muscle use


Cardiovascular Exam: Present: regular rate, normal rhythm, normal heart sounds


GI/Abdominal exam: Present: soft.  Absent: distended, tenderness, guarding


Extremities exam: Present: normal inspection, full ROM, normal capillary refill.

 Absent: tenderness


Back exam: Present: normal inspection, full ROM.  Absent: tenderness, CVA 

tenderness (R), CVA tenderness (L)


Neurological exam: Present: alert, oriented X3


Psychiatric exam: Present: normal affect, normal mood


Skin exam: Present: warm, dry, intact, normal color





Course


                                   Vital Signs











  09/15/20 09/15/20





  21:06 22:20


 


Temperature 98.7 F 


 


Pulse Rate 78 90


 


Respiratory 18 18





Rate  


 


Blood Pressure 134/84 131/76


 


O2 Sat by Pulse 97 100





Oximetry  














Chest Pain [facility]





- Differential Diagnosis


ACS, Pericarditis, Chest Wall Syndrome





- [facility]





Patient is a 30-year-old female, 14 week pregnant,  with history of 

pericarditis presenting to emergency Department with a chief complaint of chest 

pain.  On physical examination patient has reproducible midsternal chest pain to

palpation.  Likely resembling costochondritis.  Chest x-ray is unremarkable.CBC 

reveals leukocytosis but I suspect is secondary to her pregnancy.  ESR is also 

elevated at 34.  CRP 8.6.  EKG does not reveal changes that will suspect 

pericarditis.  Her pain is actually better when she is laying flat versus 

sitting or standing position.  Coags within normal limits.  Initial troponin 

negative.  I have low suspicion for pericarditis at this time. Even patient 

states this does not feel like her  pericarditis.  She was advised to follow-up 

with her OB.  Return parameters were thoroughly discussed the patient was 

understanding and agreeable.  Case was discussed with Dr. White.





Disposition


Clinical Impression: 


 Atypical chest pain, Costochondritis





Disposition: HOME SELF-CARE


Condition: Stable


Instructions (If sedation given, give patient instructions):  Chest Pain (ED), 

Costochondritis (ED)


Additional Instructions: 


Follow-up with your OB.  Return to emergency department if symptoms worsen.


Is patient prescribed a controlled substance at d/c from ED?: No


Referrals: 


None,Stated [REFERRING] - 1-2 days


Time of Disposition: 23:07

## 2020-09-15 NOTE — XR
EXAMINATION TYPE: XR chest 2V

 

DATE OF EXAM: 9/15/2020

 

COMPARISON: 4/4/2012

 

HISTORY: Chest pain

 

TECHNIQUE:

 

FINDINGS: Heart and mediastinum are normal. Lungs are clear. Diaphragm is normal. Bony thorax appears
 normal.

 

IMPRESSION: Normal chest. No change.

## 2020-10-26 ENCOUNTER — HOSPITAL ENCOUNTER (OUTPATIENT)
Dept: HOSPITAL 47 - RADUSWWP | Age: 30
Discharge: HOME | End: 2020-10-26
Attending: OBSTETRICS & GYNECOLOGY
Payer: MEDICAID

## 2020-10-26 DIAGNOSIS — Z3A.20: ICD-10-CM

## 2020-10-26 DIAGNOSIS — O36.62X0: Primary | ICD-10-CM

## 2020-10-26 DIAGNOSIS — D25.9: ICD-10-CM

## 2020-10-26 PROCEDURE — 76811 OB US DETAILED SNGL FETUS: CPT

## 2020-10-26 NOTE — US
EXAMINATION TYPE: US OB fetal anatomy transabd

 

DATE OF EXAM: 10/26/2020

 

COMPARISON: 8/3/2020

 

HISTORY: 30-year-old female O32.62X0 Large for dates Anatomy.  

 

TECHNIQUE:  Transabdominal (TA)

 

FINDINGS:

 

EXAM MEASUREMENTS:

 

GESTATIONAL AGE / DATING

Physician Established: (19 weeks/3 days)

** EDC:  3/19/2021

Dates by Current Scan for:  (20 weeks/0 days)  (5 days more growth than expected as compared to 8/3/2
020)

** EDC: 3/15/2021

 

FETAL SURVEY

 

IUP:  Single

PLACENTA: Posterior     

PREVIA: No previa   

MOHINI: 16.8 cm, Normal

CERVICAL LENGTH (transabdominal: norm > 3.0cm): 3.9 cm

 

 

FETAL BIOMETRY

FETAL LIE:  Transverse lie with head maternal Left

 

BPD: 4.7 cm**  20 weeks / 1 days

HC: 17.8 cm**  20 weeks / 2 days

AC: 15.7 cm**  20 weeks / 6 days

FL: 3.1 cm**  19 weeks / 5 days

 

ESTIMATED FETAL WEIGHT IN GRAMS:  345.3 grams

ESTIMATED FETAL WEIGHT IN LBS/OZ:  0 lbs. 12 oz. 

WEIGHT PERCENTAGE BASED ON ESTABLISHED DATE:  90.2 %

 

** HC/AC:  1.1  Normal 

** FL/AC:  20.0

 

HEART RATE:  154 bpm 

RHYTHM: Normal

 

 

 

ANATOMY SEEN (within normal limits): 

Lateral Vent (< 1 cm) 0.5 cm

Cisterna Magna (< 1.1 cm) 0.5 cm

Cerebellum (varies with age) 2.0 cm

Choroid Plexus (bilateral)

Midline Falx 

Cavum Septum Pellucidim   

Four Chamber Heart

Outflow tracts:  LVOT/RVOT

Stomach

Situs

Nose / Lips 

Diaphragm 

Kidneys (bilateral) 

Bladder

Cord Insert 

Three Vessel Cord 

Longitudinal Spine 

Transverse Spine 

Arms (bilateral)

Legs (bilateral)

Feet (bilateral)

Hands (bilateral)

 

 

Sonographer notes: Single live IUP measuring 20 weeks 0 days.  Anterior hypoechoic uterine lesion vis
ualized = 3.6 x 3.8 x 2.1 cm (measured 1.9 cm on 8/3/2020).  

 

 

 

IMPRESSION:

 

1. Single live intrauterine pregnancy with estimated gestational age of 19 weeks 3 days by LMP. [facility] ultrasound biometry is larger (20 weeks 0 days) with 5 days more growth than expected as compared 
to 8/3/2020. This places the gestation at the 90th percentile for weight.

2. The fetal anatomy appears normal.

3. A 3.8 cm anterior fibroid increased in size from 8/3/2020 where it measured 1.9 cm.

## 2020-11-13 ENCOUNTER — HOSPITAL ENCOUNTER (OUTPATIENT)
Dept: HOSPITAL 47 - FBPOP | Age: 30
Discharge: HOME | End: 2020-11-13
Attending: OBSTETRICS & GYNECOLOGY
Payer: MEDICAID

## 2020-11-13 VITALS
RESPIRATION RATE: 18 BRPM | HEART RATE: 93 BPM | TEMPERATURE: 97.6 F | DIASTOLIC BLOOD PRESSURE: 83 MMHG | SYSTOLIC BLOOD PRESSURE: 132 MMHG

## 2020-11-13 DIAGNOSIS — O99.891: Primary | ICD-10-CM

## 2020-11-13 DIAGNOSIS — Z3A.22: ICD-10-CM

## 2020-11-13 DIAGNOSIS — R51.9: ICD-10-CM

## 2020-11-13 LAB
ALT SERPL-CCNC: 19 U/L (ref 4–34)
AST SERPL-CCNC: 22 U/L (ref 14–36)
BASOPHILS # BLD AUTO: 0 K/UL (ref 0–0.2)
BASOPHILS NFR BLD AUTO: 0 %
BUN SERPL-SCNC: 10 MG/DL (ref 7–17)
EOSINOPHIL # BLD AUTO: 0.1 K/UL (ref 0–0.7)
EOSINOPHIL NFR BLD AUTO: 1 %
ERYTHROCYTE [DISTWIDTH] IN BLOOD BY AUTOMATED COUNT: 3.7 M/UL (ref 3.8–5.4)
ERYTHROCYTE [DISTWIDTH] IN BLOOD: 13 % (ref 11.5–15.5)
HCT VFR BLD AUTO: 33.9 % (ref 34–46)
HGB BLD-MCNC: 11.1 GM/DL (ref 11.4–16)
LDH SPEC-CCNC: 298 U/L (ref 313–618)
LYMPHOCYTES # SPEC AUTO: 2 K/UL (ref 1–4.8)
LYMPHOCYTES NFR SPEC AUTO: 18 %
MCH RBC QN AUTO: 30.1 PG (ref 25–35)
MCHC RBC AUTO-ENTMCNC: 32.9 G/DL (ref 31–37)
MCV RBC AUTO: 91.4 FL (ref 80–100)
MONOCYTES # BLD AUTO: 0.5 K/UL (ref 0–1)
MONOCYTES NFR BLD AUTO: 5 %
NEUTROPHILS # BLD AUTO: 8.1 K/UL (ref 1.3–7.7)
NEUTROPHILS NFR BLD AUTO: 74 %
PH UR: 5.5 [PH] (ref 5–8)
PLATELET # BLD AUTO: 220 K/UL (ref 150–450)
PROT/CREAT UR-RTO: 0.05
SP GR UR: 1.02 (ref 1–1.03)
SQUAMOUS UR QL AUTO: 15 /HPF (ref 0–4)
URATE SERPL-MCNC: 3 MG/DL (ref 3.7–7.4)
UROBILINOGEN UR QL STRIP: <2 MG/DL (ref ?–2)
WBC # BLD AUTO: 10.9 K/UL (ref 3.8–10.6)
WBC #/AREA URNS HPF: 1 /HPF (ref 0–5)

## 2020-11-13 PROCEDURE — 85025 COMPLETE CBC W/AUTO DIFF WBC: CPT

## 2020-11-13 PROCEDURE — 59025 FETAL NON-STRESS TEST: CPT

## 2020-11-13 PROCEDURE — 81001 URINALYSIS AUTO W/SCOPE: CPT

## 2020-11-13 PROCEDURE — 84460 ALANINE AMINO (ALT) (SGPT): CPT

## 2020-11-13 PROCEDURE — 84520 ASSAY OF UREA NITROGEN: CPT

## 2020-11-13 PROCEDURE — 82570 ASSAY OF URINE CREATININE: CPT

## 2020-11-13 PROCEDURE — 84450 TRANSFERASE (AST) (SGOT): CPT

## 2020-11-13 PROCEDURE — 83615 LACTATE (LD) (LDH) ENZYME: CPT

## 2020-11-13 PROCEDURE — 84550 ASSAY OF BLOOD/URIC ACID: CPT

## 2020-11-13 PROCEDURE — 84156 ASSAY OF PROTEIN URINE: CPT

## 2020-11-19 ENCOUNTER — HOSPITAL ENCOUNTER (OUTPATIENT)
Dept: HOSPITAL 47 - FBPOP | Age: 30
Discharge: HOME | End: 2020-11-19
Attending: OBSTETRICS & GYNECOLOGY
Payer: MEDICAID

## 2020-11-19 VITALS
SYSTOLIC BLOOD PRESSURE: 112 MMHG | DIASTOLIC BLOOD PRESSURE: 65 MMHG | TEMPERATURE: 97.6 F | RESPIRATION RATE: 16 BRPM | HEART RATE: 106 BPM

## 2020-11-19 DIAGNOSIS — O99.891: Primary | ICD-10-CM

## 2020-11-19 DIAGNOSIS — Z3A.22: ICD-10-CM

## 2020-11-19 PROCEDURE — 99213 OFFICE O/P EST LOW 20 MIN: CPT

## 2020-11-23 ENCOUNTER — HOSPITAL ENCOUNTER (EMERGENCY)
Dept: HOSPITAL 47 - EC | Age: 30
Discharge: NURSING, REHAB OR LONG-TERM CARE | End: 2020-11-23
Payer: MEDICAID

## 2020-11-23 VITALS — DIASTOLIC BLOOD PRESSURE: 70 MMHG | RESPIRATION RATE: 12 BRPM | HEART RATE: 99 BPM | SYSTOLIC BLOOD PRESSURE: 124 MMHG

## 2020-11-23 VITALS — TEMPERATURE: 98 F

## 2020-11-23 DIAGNOSIS — O99.891: Primary | ICD-10-CM

## 2020-11-23 DIAGNOSIS — R00.0: ICD-10-CM

## 2020-11-23 DIAGNOSIS — Z3A.23: ICD-10-CM

## 2020-11-23 DIAGNOSIS — R07.89: ICD-10-CM

## 2020-11-23 LAB
ALBUMIN SERPL-MCNC: 4 G/DL (ref 3.5–5)
ALP SERPL-CCNC: 67 U/L (ref 38–126)
ALT SERPL-CCNC: 18 U/L (ref 4–34)
ANION GAP SERPL CALC-SCNC: 7 MMOL/L
AST SERPL-CCNC: 21 U/L (ref 14–36)
BASOPHILS # BLD AUTO: 0 K/UL (ref 0–0.2)
BASOPHILS NFR BLD AUTO: 0 %
BUN SERPL-SCNC: 8 MG/DL (ref 7–17)
CALCIUM SPEC-MCNC: 9.9 MG/DL (ref 8.4–10.2)
CHLORIDE SERPL-SCNC: 105 MMOL/L (ref 98–107)
CO2 SERPL-SCNC: 21 MMOL/L (ref 22–30)
EOSINOPHIL # BLD AUTO: 0.1 K/UL (ref 0–0.7)
EOSINOPHIL NFR BLD AUTO: 0 %
ERYTHROCYTE [DISTWIDTH] IN BLOOD BY AUTOMATED COUNT: 4.23 M/UL (ref 3.8–5.4)
ERYTHROCYTE [DISTWIDTH] IN BLOOD: 13.3 % (ref 11.5–15.5)
GLUCOSE SERPL-MCNC: 123 MG/DL (ref 74–99)
HCT VFR BLD AUTO: 38.4 % (ref 34–46)
HGB BLD-MCNC: 12.2 GM/DL (ref 11.4–16)
LYMPHOCYTES # SPEC AUTO: 1.9 K/UL (ref 1–4.8)
LYMPHOCYTES NFR SPEC AUTO: 14 %
MCH RBC QN AUTO: 28.9 PG (ref 25–35)
MCHC RBC AUTO-ENTMCNC: 31.9 G/DL (ref 31–37)
MCV RBC AUTO: 90.8 FL (ref 80–100)
MONOCYTES # BLD AUTO: 0.5 K/UL (ref 0–1)
MONOCYTES NFR BLD AUTO: 4 %
NEUTROPHILS # BLD AUTO: 11.3 K/UL (ref 1.3–7.7)
NEUTROPHILS NFR BLD AUTO: 81 %
PLATELET # BLD AUTO: 263 K/UL (ref 150–450)
POTASSIUM SERPL-SCNC: 4.5 MMOL/L (ref 3.5–5.1)
PROT SERPL-MCNC: 7.3 G/DL (ref 6.3–8.2)
SODIUM SERPL-SCNC: 133 MMOL/L (ref 137–145)
WBC # BLD AUTO: 14 K/UL (ref 3.8–10.6)

## 2020-11-23 PROCEDURE — 80053 COMPREHEN METABOLIC PANEL: CPT

## 2020-11-23 PROCEDURE — 93005 ELECTROCARDIOGRAM TRACING: CPT

## 2020-11-23 PROCEDURE — 85379 FIBRIN DEGRADATION QUANT: CPT

## 2020-11-23 PROCEDURE — 85025 COMPLETE CBC W/AUTO DIFF WBC: CPT

## 2020-11-23 PROCEDURE — 99285 EMERGENCY DEPT VISIT HI MDM: CPT

## 2020-11-23 PROCEDURE — 36415 COLL VENOUS BLD VENIPUNCTURE: CPT

## 2020-11-23 NOTE — P.MSEPDOC
Presenting Problems





- Arrival Data


Date of Arrival on Unit: 20


Time of Arrival on Unit: 13:56


Mode of Transport: Ambulatory





- Complaint


OB-Reason for Admission/Chief Complaint: Other


Comment: chest pain





Prenatal Medical History





- Pregnancy Information


: 1


Para: 0


Term: 0


: 0


Abortions: Spontaneous or Elective: 0


Number of Living Children: 0





- Gestational Age


Gestational Age by RENEE (wks/days): 22 Weeks and 6 Days





Review of Systems





- Review of Systems


Constitutional: No problems


Breast: No problems


ENT: No problems


Cardiovascular: Chest pain


Respiratory: No problems


Gastrointestinal: No problems


Genitourinary: No problems


Musculoskeletal: No problems


Neurological: No problems


Skin: No problems





Vital Signs





- Temperature


Temperature: 97.6 F


Temperature Source: Oral





- Pulse


  ** Right Apical


Pulse Rate: 106


Pulse Assessment Method: Automatic Cuff





- Respirations


Respiratory Rate: 16


Oxygen Delivery Method: Room Air





- Blood Pressure


  ** Right Arm


Blood Pressure: 112/65


Blood Pressure Mean: 80


Blood Pressure Source: Automatic Cuff





Medical Screen Scoring (Pre)





- Cervical Exam


Dilation: Exam Deferred





- Uterine Contractions


Frequency: N/A


Duration: N/A


Intensity: N/A





- Maternal Vital Signs


Maternal Temperature: N/A


Maternal Blood Pressure: N/A


Signs of Preeclampsia: N/A


Maternal Respirations: N/A





- Maternal Trauma


Maternal Trauma: N/A





- Fetal Assessment - Baby A


Baseline FHR: 150





- Total Score - Baby A


Total Score - Baby A: 0





- Total Score - Baby B


Total Score - Baby B: 0





- Total Score - Baby C


Total Score - Baby C: 0





- Level of Risk - Baby A


Level of Risk - Baby A: Low (0-5)





- Level of Risk - Baby B


Level of Risk - Baby B: Low (0-5)





- Level of Risk - Baby C


Level of Risk - Baby C: Low (0-5)





Physician Notification (Pre)





- Physician Notified


Physician Notified Date: 20


Physician Notified Time: 14:40


New Order Received: Yes





- Notification Comment


Comment: reported pt visit, right upper chest pain, sharp shooting, with 

increased pain with deep breath. tingling in fingers.no shortness of breath or 

difficulty breathing. pulse ox , lungs clear. cap refill less than 3 

seconds, bilateral  strong and equal.





Disposition





- Disposition


OB Disposition: Discharge to home


Discharge Date: 20


Discharge Time: 14:54


I agree with the RN Medical Screening Exam: Yes


Risk & Benefit of care provided described in d/c instruction: Yes


Diagnosis: CHEST PAIN ON BREATHING

## 2020-11-23 NOTE — ED
General Adult HPI





- General


Chief complaint: Chest Pain


Stated complaint: Chest Pain-23 Wks Pregnant


Time Seen by Provider: 11/23/20 13:35


Source: patient, RN notes reviewed, old records reviewed


Mode of arrival: ambulatory


Limitations: no limitations





- History of Present Illness


Initial comments: 





This is a 30-year-old female who is 23 weeks pregnant.  Patient states she has 

no chest pain on the left side of her chest and is sharp in nature.  Patient 

states this pain started about a week ago.  Patient states is reproducible with 

palpation.  Patient states occasionally when the chest pain occurs it takes her 

breath away but she has not at the moment short of breath.  Patient denies any 

fever chills or cough.  Patient denies any trauma though she states she does a 

lot of lifting at work.  Patient denies any increased swelling to her legs or 

calf tenderness.  Patient denies any abdominal pain.  Patient denies any back 

pain.  





- Related Data


                                Home Medications











 Medication  Instructions  Recorded  Confirmed


 


Pnv,Calcium 72/Iron/Folic Acid 1 tab PO DAILY 11/23/20 11/23/20





[Prenatal Plus Tablet]   











                                    Allergies











Allergy/AdvReac Type Severity Reaction Status Date / Time


 


No Known Allergies Allergy   Verified 11/23/20 13:38














Review of Systems


ROS Statement: 


Those systems with pertinent positive or pertinent negative responses have been 

documented in the HPI.





ROS Other: All systems not noted in ROS Statement are negative.





Past Medical History


Past Medical History: No Reported History


Additional Past Medical History / Comment(s): Pericarditis; Kidney Stones, 

fibroid cysts


History of Any Multi-Drug Resistant Organisms: None Reported


Past Surgical History: No Surgical Hx Reported


Past Psychological History: Anxiety, Depression


Smoking Status: Never smoker


Past Alcohol Use History: None Reported


Past Drug Use History: None Reported





General Exam





- General Exam Comments


Initial Comments: 





GENERAL:


Patient is well-developed and well-nourished.  Patient is nontoxic and well-

hydrated and is in no acute distress.





ENT:


Neck is soft and supple.  No significant lymphadenopathy is noted.  Oropharynx 

is clear.  Moist mucous membranes.  Neck has full range of motion without 

eliciting any pain. 





EYES:


The sclera were anicteric and conjunctiva were pink and moist.  Extraocular 

movements were intact and pupils were equal round and reactive to light.  

Eyelids were unremarkable.





PULMONARY:


Unlabored respirations.  Good breath sounds bilaterally.  No audible rales 

rhonchi or wheezing was noted.





CARDIOVASCULAR:


There is a regular rate and rhythm without any murmurs gallops or rubs.  Pain is

reproducible on palpation just the left of the upper sternum.  Patient stated 

that was the exact pain she experienced earlier. 





ABDOMEN:


Soft and nontender with normal bowel sounds.  





SKIN:


Skin is clear with no lesions or rashes and otherwise unremarkable.





NEUROLOGIC:


Patient is alert and oriented x3.  Cranial nerves II through XII are grossly 

intact.  Motor and sensory are also intact.  Normal speech, volume and content. 

Symmetrical smile.





MUSCULOSKELETAL:


Normal extremities with adequate strength and full range of motion.  No lower 

extremity swelling or edema.  No calf tenderness.





LYMPHATICS:


No significant lymphadenopathy is noted





PSYCHIATRIC:


Normal psychiatric evaluation.  


Limitations: no limitations





Course


                                   Vital Signs











  11/23/20 11/23/20





  13:38 14:29


 


Temperature 98 F 


 


Pulse Rate 105 H 125 H


 


Respiratory 18 16





Rate  


 


Blood Pressure 123/83 


 


O2 Sat by Pulse 99 100





Oximetry  














Medical Decision Making





- Medical Decision Making





EKG shows sinus tachycardia at 106 bpm TX interval is on a 24 QRS is 82 QT 

interval 334 QTC is 443.  Patient's EKG shows small Q waves in the inferior 

leads which was seen there 2 months according an old EKG.  There were also seen 

a couple years ago.





I will back in the room to reevaluate the patient she was chest pain-free until 

I palpated her chest.





- Lab Data


Result diagrams: 


                                 11/23/20 14:26





                                 11/23/20 14:26


                                   Lab Results











  11/23/20 11/23/20 11/23/20 Range/Units





  14:26 14:26 14:26 


 


WBC  14.0 H    (3.8-10.6)  k/uL


 


RBC  4.23    (3.80-5.40)  m/uL


 


Hgb  12.2    (11.4-16.0)  gm/dL


 


Hct  38.4    (34.0-46.0)  %


 


MCV  90.8    (80.0-100.0)  fL


 


MCH  28.9    (25.0-35.0)  pg


 


MCHC  31.9    (31.0-37.0)  g/dL


 


RDW  13.3    (11.5-15.5)  %


 


Plt Count  263    (150-450)  k/uL


 


MPV  9.0    


 


Neutrophils %  81    %


 


Lymphocytes %  14    %


 


Monocytes %  4    %


 


Eosinophils %  0    %


 


Basophils %  0    %


 


Neutrophils #  11.3 H    (1.3-7.7)  k/uL


 


Lymphocytes #  1.9    (1.0-4.8)  k/uL


 


Monocytes #  0.5    (0-1.0)  k/uL


 


Eosinophils #  0.1    (0-0.7)  k/uL


 


Basophils #  0.0    (0-0.2)  k/uL


 


D-Dimer   0.46   (<0.60)  mg/L FEU


 


Sodium    133 L  (137-145)  mmol/L


 


Potassium    4.5  (3.5-5.1)  mmol/L


 


Chloride    105  ()  mmol/L


 


Carbon Dioxide    21 L  (22-30)  mmol/L


 


Anion Gap    7  mmol/L


 


BUN    8  (7-17)  mg/dL


 


Creatinine    0.43 L  (0.52-1.04)  mg/dL


 


Est GFR (CKD-EPI)AfAm    >90  (>60 ml/min/1.73 sqM)  


 


Est GFR (CKD-EPI)NonAf    >90  (>60 ml/min/1.73 sqM)  


 


Glucose    123 H  (74-99)  mg/dL


 


Calcium    9.9  (8.4-10.2)  mg/dL


 


Total Bilirubin    0.2  (0.2-1.3)  mg/dL


 


AST    21  (14-36)  U/L


 


ALT    18  (4-34)  U/L


 


Alkaline Phosphatase    67  ()  U/L


 


Total Protein    7.3  (6.3-8.2)  g/dL


 


Albumin    4.0  (3.5-5.0)  g/dL














Disposition


Clinical Impression: 


 Chest wall pain





Disposition: [facility]


Instructions (If sedation given, give patient instructions):  Chest Wall Pain 

(ED)


Is patient prescribed a controlled substance at d/c from ED?: No


Referrals: 


Bennett Saucedo MD [Primary Care Provider] - 1-2 days


Time of Disposition: 15:15

## 2020-12-30 ENCOUNTER — HOSPITAL ENCOUNTER (OUTPATIENT)
Dept: HOSPITAL 47 - LABWHC1 | Age: 30
Discharge: HOME | End: 2020-12-30
Attending: OBSTETRICS & GYNECOLOGY
Payer: MEDICAID

## 2020-12-30 DIAGNOSIS — Z34.02: Primary | ICD-10-CM

## 2020-12-30 LAB
ERYTHROCYTE [DISTWIDTH] IN BLOOD BY AUTOMATED COUNT: 4.01 M/UL (ref 3.8–5.4)
ERYTHROCYTE [DISTWIDTH] IN BLOOD: 13.9 % (ref 11.5–15.5)
HCT VFR BLD AUTO: 35.8 % (ref 34–46)
HGB BLD-MCNC: 12.1 GM/DL (ref 11.4–16)
MCH RBC QN AUTO: 30.2 PG (ref 25–35)
MCHC RBC AUTO-ENTMCNC: 33.7 G/DL (ref 31–37)
MCV RBC AUTO: 89.4 FL (ref 80–100)
PLATELET # BLD AUTO: 211 K/UL (ref 150–450)
WBC # BLD AUTO: 12 K/UL (ref 3.8–10.6)

## 2020-12-30 PROCEDURE — 36415 COLL VENOUS BLD VENIPUNCTURE: CPT

## 2020-12-30 PROCEDURE — 86850 RBC ANTIBODY SCREEN: CPT

## 2020-12-30 PROCEDURE — 85027 COMPLETE CBC AUTOMATED: CPT

## 2020-12-30 PROCEDURE — 82950 GLUCOSE TEST: CPT

## 2021-01-16 ENCOUNTER — HOSPITAL ENCOUNTER (OUTPATIENT)
Dept: HOSPITAL 47 - FBPOP | Age: 31
LOS: 1 days | Discharge: HOME | End: 2021-01-17
Attending: OBSTETRICS & GYNECOLOGY
Payer: MEDICAID

## 2021-01-16 DIAGNOSIS — O16.3: Primary | ICD-10-CM

## 2021-01-16 DIAGNOSIS — Z3A.31: ICD-10-CM

## 2021-01-16 DIAGNOSIS — O24.419: ICD-10-CM

## 2021-01-16 LAB
ALT SERPL-CCNC: 18 U/L (ref 4–34)
AST SERPL-CCNC: 25 U/L (ref 14–36)
BASOPHILS # BLD AUTO: 0 K/UL (ref 0–0.2)
BASOPHILS NFR BLD AUTO: 0 %
BUN SERPL-SCNC: 10 MG/DL (ref 7–17)
EOSINOPHIL # BLD AUTO: 0.2 K/UL (ref 0–0.7)
EOSINOPHIL NFR BLD AUTO: 2 %
ERYTHROCYTE [DISTWIDTH] IN BLOOD BY AUTOMATED COUNT: 3.95 M/UL (ref 3.8–5.4)
ERYTHROCYTE [DISTWIDTH] IN BLOOD: 14.3 % (ref 11.5–15.5)
GLUCOSE BLD-MCNC: 104 MG/DL (ref 75–99)
GLUCOSE UR QL: (no result)
HCT VFR BLD AUTO: 34.8 % (ref 34–46)
HGB BLD-MCNC: 11.8 GM/DL (ref 11.4–16)
KETONES UR QL STRIP.AUTO: (no result)
LDH SPEC-CCNC: 387 U/L (ref 313–618)
LYMPHOCYTES # SPEC AUTO: 1.6 K/UL (ref 1–4.8)
LYMPHOCYTES NFR SPEC AUTO: 16 %
MCH RBC QN AUTO: 30 PG (ref 25–35)
MCHC RBC AUTO-ENTMCNC: 34 G/DL (ref 31–37)
MCV RBC AUTO: 88.3 FL (ref 80–100)
MONOCYTES # BLD AUTO: 0.5 K/UL (ref 0–1)
MONOCYTES NFR BLD AUTO: 5 %
NEUTROPHILS # BLD AUTO: 7.4 K/UL (ref 1.3–7.7)
NEUTROPHILS NFR BLD AUTO: 74 %
PH UR: 6 [PH] (ref 5–8)
PLATELET # BLD AUTO: 186 K/UL (ref 150–450)
PROT/CREAT UR-RTO: 0.13
RBC UR QL: <1 /HPF (ref 0–5)
SP GR UR: 1.02 (ref 1–1.03)
SQUAMOUS UR QL AUTO: 9 /HPF (ref 0–4)
URATE SERPL-MCNC: 3.9 MG/DL (ref 3.7–7.4)
UROBILINOGEN UR QL STRIP: <2 MG/DL (ref ?–2)
WBC # BLD AUTO: 9.9 K/UL (ref 3.8–10.6)
WBC #/AREA URNS HPF: 4 /HPF (ref 0–5)

## 2021-01-16 PROCEDURE — 81001 URINALYSIS AUTO W/SCOPE: CPT

## 2021-01-16 PROCEDURE — 84460 ALANINE AMINO (ALT) (SGPT): CPT

## 2021-01-16 PROCEDURE — 83615 LACTATE (LD) (LDH) ENZYME: CPT

## 2021-01-16 PROCEDURE — 99213 OFFICE O/P EST LOW 20 MIN: CPT

## 2021-01-16 PROCEDURE — 59025 FETAL NON-STRESS TEST: CPT

## 2021-01-16 PROCEDURE — 85025 COMPLETE CBC W/AUTO DIFF WBC: CPT

## 2021-01-16 PROCEDURE — 84520 ASSAY OF UREA NITROGEN: CPT

## 2021-01-16 PROCEDURE — 82565 ASSAY OF CREATININE: CPT

## 2021-01-16 PROCEDURE — 84550 ASSAY OF BLOOD/URIC ACID: CPT

## 2021-01-16 PROCEDURE — 84156 ASSAY OF PROTEIN URINE: CPT

## 2021-01-16 PROCEDURE — 84450 TRANSFERASE (AST) (SGOT): CPT

## 2021-01-16 PROCEDURE — 82570 ASSAY OF URINE CREATININE: CPT

## 2021-01-17 VITALS
DIASTOLIC BLOOD PRESSURE: 80 MMHG | TEMPERATURE: 97 F | HEART RATE: 105 BPM | SYSTOLIC BLOOD PRESSURE: 153 MMHG | RESPIRATION RATE: 16 BRPM

## 2021-01-17 NOTE — P.PN
Progress Note - Text


Progress Note Date: 21





Patient is a 30-year-old  at 31 weeks gestation who arrives complaining of 

lower pelvic pain.  While in labor and delivery multiple elevated blood 

pressures were noted.  Preeclamptic labs were drawn and all were normal.  

Platelets were 186 is TLT were normal LDH was normal and her protein creatinine 

ratio was 0.19.  With her resting comfortably her blood pressures have come down

into the normal range 130s over 70s and 80s.  She has no other signs or symptoms

of preeclampsia including but not limited to no headache, epigastric pain or 

visual changes.  Her deep tendon reflexes are +2.  She is also a newly diagnosed

gestational diabetic.  She is currently a 1 and has been checking her blood 

sugars for approximately 1 week.  A hemoglobin A1c was also drawn by maternal-

fetal medicine whom she saw last week but no result is back.  We did do a random

blood sugar with her work tonight and was 1053 hours after her meal.  She seems 

to be following the plan correctly at this time but she is uncertain as to the 

Gold numbers for her blood sugars and I did review this with her in detail.  

Since having her here in labor and delivery and having her resting her pain has 

significantly decreased.  It was located in the midline lower uterine area 

basically where the head of is an I'm believe it is probably fetal movement that

was causing the pain or ligament stretching as the symptoms are getting better 

with no therapy at all.  Her urine had +2 ketones and +3 glucose but no red 

blood cells or any other signs of an infection.  Her white count was also 

normal.  She also complains of a rash bilateral inner thighs which is noted it 

is slightly raised and red and it is pruritic.  She denies any home her were 

solar itching and no other complaints.  A prescription for a steroid cream was 

provided and that she can fill tomorrow.  She has an appointment with Dr. Trammell

on Monday.  She had discussed staying in the hospital for continued observation 

but with her blood pressures stabilizing and her pain improving I don't really 

have a recent have to keep her at this time.  She is aware to return if any of 

the signs or symptoms of preeclampsia arise or if there are other problems or 

concerns.  She is going to speak with maternal fetal medicine the next few days 

for her review of her Glucola testing.  All the questions were answered for her 

at this time.





On physical exam vital signs are currently stable with reviewed blood pressures 

that were minimally elevated 150s over 70s 140s over 80s and 90s.  These have 

improved with rest.  Her pain has also dissipated so that also may be 

contributing to her lower blood pressures.


Heart regular lungs clear abdomen soft there is no tenderness anywhere on or 

around her abdomen I did press relatively firmly over the year she was 

complaining about pain I did not palpate anything and she did not show any sign 

of pain at this time.  Extremities have minimal edema that is nonpitting and 

again deep tendon reflexes are normal.





Assessment [facility] plan intrauterine pregnancy  31 weeks with gestational diabetes 

and suspected gestational hypertension follow up with Dr. Trammell on Monday 

return with worsening of symptoms.

## 2021-01-17 NOTE — P.MSEPDOC
Presenting Problems





- Arrival Data


Date of Arrival on Unit: 21


Time of Arrival on Unit: 21:20


Mode of Transport: Wheelchair





- Complaint


OB-Reason for Admission/Chief Complaint: Pain


Comment: Patient presents to triage with cramping that started around 1200pm 

this.  afternoon, states the cramping is constant in nature, denies leaking of 

fluid or vaginal.  bleeding. Patient states she has an ovarian cyst and states 

the pain sort of feels like.  that, but she is unsure. Patient up to the 

bathroom at this time.





Prenatal Medical History





- Pregnancy Information


: 1


Para: 0


Term: 0


: 0


Abortions: Spontaneous or Elective: 0


Number of Living Children: 0





- Gestational Age


Gestational Age by RENEE (wks/days): 31 Weeks and 2 Days





- Prenatal History


Pregnancy Complications: GDM





Review of Systems





- Review of Systems


Constitutional: No problems


Breast: No problems


ENT: No problems


Cardiovascular: No problems


Respiratory: No problems


Gastrointestinal: No problems


Genitourinary: No problems


Musculoskeletal: No problems


Neurological: No problems


Skin: No problems





Vital Signs





- Temperature


Temperature: 97.0 F


Temperature Source: Temporal Artery Scan





- Pulse


  ** Right Brachial


Pulse Rate: 105


Pulse Assessment Method: Automatic Cuff





- Respirations


Respiratory Rate: 16


Oxygen Delivery Method: Room Air


O2 Sat by Pulse Oximetry: 99





- Blood Pressure


  ** Right Arm


Blood Pressure: 153/80


Blood Pressure Mean: 104


Blood Pressure Source: Automatic Cuff





Medical Screen Scoring (Pre)





- Cervical Exam


Dilation: Exam Deferred


Effacement: Exam Deferred


Membranes: Intact





- Uterine Contractions


Frequency: N/A


Duration: N/A


Intensity: N/A





- Maternal Vital Signs


Maternal Temperature: N/A


Maternal Blood Pressure: N/A


Signs of Preeclampsia: N/A


Maternal Respirations: N/A





- Maternal Trauma


Maternal Trauma: N/A





- Fetal Assessment - Baby A


Baseline FHR: 145


Fetal Heart Rate - NICHD Category: Category I (Normal) = 0


NST: Reactive


Fetal Position: N/A


Fetal Station: N/A





- Total Score - Baby A


Total Score - Baby A: 0





- Total Score - Baby B


Total Score - Baby B: 0





- Total Score - Baby C


Total Score - Baby C: 0





- Level of Risk - Baby A


Level of Risk - Baby A: Low (0-5)





- Level of Risk - Baby B


Level of Risk - Baby B: Low (0-5)





- Level of Risk - Baby C


Level of Risk - Baby C: Low (0-5)





Physician Notification (Pre)





- Physician Notified


Physician Notified Date: 21


Physician Notified Time: 21:48


New Order Received: Yes





- Notification Comment


Comment: RN spoke with Dr. Ashton fernandorandell patient. RN relayed that patient 

states she.  has been having constant bilateral lower pelvic pain since noon 

today. Patient describes.  the pain as a 7/10. She tried to rest and elevate her

feet but it did not help. Upon.  evaluation her blood pressure was elevated. She

states her pressure has been elevated at times throughout her pregnancy. Dr. Ashton was able to look at her prenatal visits and her blood pressures were all

recorded as within normal limits. RN to try a warm blanket to pelvic area and 

PIH labs are going to be drawn. Labs were normal, urinalysis was normal. Patient

given documents about pre-eclampsia and preeclampsia was discussed at length 

with patient by RN and 





Disposition





- Disposition


OB Disposition: Discharge to home


Discharge Date: 21


Discharge Time: 00:35


I agree with the RN Medical Screening Exam: Yes


Risk & Benefit of care provided described in d/c instruction: Yes


Diagnosis: GESTATIONAL HTN W/O SIGNIFICANT PROTEINURIA, THIRD TRIMESTER

## 2021-01-22 ENCOUNTER — HOSPITAL ENCOUNTER (OUTPATIENT)
Dept: HOSPITAL 47 - LABWHC1 | Age: 31
Discharge: HOME | End: 2021-01-22
Attending: OBSTETRICS & GYNECOLOGY
Payer: MEDICAID

## 2021-01-22 DIAGNOSIS — O99.891: Primary | ICD-10-CM

## 2021-01-22 DIAGNOSIS — K71.0: ICD-10-CM

## 2021-01-22 LAB
ALT SERPL-CCNC: 21 U/L (ref 8–44)
AST SERPL-CCNC: 21 U/L (ref 13–35)

## 2021-01-22 PROCEDURE — 36415 COLL VENOUS BLD VENIPUNCTURE: CPT

## 2021-01-22 PROCEDURE — 84460 ALANINE AMINO (ALT) (SGPT): CPT

## 2021-01-22 PROCEDURE — 84450 TRANSFERASE (AST) (SGOT): CPT

## 2021-01-22 PROCEDURE — 82239 BILE ACIDS TOTAL: CPT

## 2021-02-09 NOTE — P.MSEPDOC
Presenting Problems





- Arrival Data


Date of Arrival on Unit: 21


Time of Arrival on Unit: 21:20


Mode of Transport: Wheelchair





- Complaint


OB-Reason for Admission/Chief Complaint: Pain


Comment: Patient presents to triage with cramping that started around 1200pm 

this.  afternoon, states the cramping is constant in nature, denies leaking of 

fluid or vaginal.  bleeding. Patient states she has an ovarian cyst and states 

the pain sort of feels like.  that, but she is unsure. Patient up to the 

bathroom at this time.





Prenatal Medical History





- Pregnancy Information


: 1


Para: 0


Term: 0


: 0


Abortions: Spontaneous or Elective: 0


Number of Living Children: 0





- Gestational Age


Gestational Age by RENEE (wks/days): 31 Weeks and 2 Days





- Prenatal History


Pregnancy Complications: GDM





Review of Systems





- Review of Systems


Constitutional: No problems


Breast: No problems


ENT: No problems


Cardiovascular: No problems


Respiratory: No problems


Gastrointestinal: No problems


Genitourinary: No problems


Musculoskeletal: No problems


Neurological: No problems


Skin: No problems





Vital Signs





- Temperature


Temperature: 97.0 F


Temperature Source: Temporal Artery Scan





- Pulse


  ** Right Brachial


Pulse Rate: 105


Pulse Assessment Method: Automatic Cuff





- Respirations


Respiratory Rate: 16


Oxygen Delivery Method: Room Air


O2 Sat by Pulse Oximetry: 99





- Blood Pressure


  ** Right Arm


Blood Pressure: 153/80


Blood Pressure Mean: 104


Blood Pressure Source: Automatic Cuff





Medical Screen Scoring (Pre)





- Cervical Exam


Dilation: Exam Deferred


Effacement: Exam Deferred


Membranes: Intact





- Uterine Contractions


Frequency: N/A


Duration: N/A


Intensity: N/A





- Maternal Vital Signs


Maternal Temperature: N/A


Maternal Blood Pressure: N/A


Signs of Preeclampsia: N/A


Maternal Respirations: N/A





- Maternal Trauma


Maternal Trauma: N/A





- Fetal Assessment - Baby A


Baseline FHR: 145


Fetal Heart Rate - NICHD Category: Category I (Normal) = 0


NST: Reactive


Fetal Position: N/A


Fetal Station: N/A





- Total Score - Baby A


Total Score - Baby A: 0





- Total Score - Baby B


Total Score - Baby B: 0





- Total Score - Baby C


Total Score - Baby C: 0





- Level of Risk - Baby A


Level of Risk - Baby A: Low (0-5)





- Level of Risk - Baby B


Level of Risk - Baby B: Low (0-5)





- Level of Risk - Baby C


Level of Risk - Baby C: Low (0-5)





Physician Notification (Pre)





- Physician Notified


Physician Notified Date: 21


Physician Notified Time: 21:48


New Order Received: Yes





- Notification Comment


Comment: RN spoke with Dr. Daisha rhodes patient. RN relayed that patient 

states she.  has been having constant bilateral lower pelvic pain since noon 

today. Patient describes.  the pain as a 7/10. She tried to rest and elevate her

feet but it did not help. Upon.  evaluation her blood pressure was elevated. She

states her pressure has been elevated at times throughout her pregnancy. Dr. Ashton was able to look at her prenatal visits and her blood pressures were all

recorded as within normal limits. RN to try a warm blanket to pelvic area and 

PIH labs are going to be drawn. Labs were normal, urinalysis was normal. Patient

given documents about pre-eclampsia and preeclampsia was discussed at length 

with patient by RN and 





Disposition





- Disposition


OB Disposition: Discharge to home


Discharge Date: 21


Discharge Time: 00:35


I agree with the RN Medical Screening Exam: Yes


Case reviewed; plan agreed upon as documented in EMR[employer]BI[employer].: Yes


Diagnosis: FALSE LABOR BEFORE 37 COMPLETED WEEKS OF GEST, SECOND TRI

## 2021-02-19 ENCOUNTER — HOSPITAL ENCOUNTER (OUTPATIENT)
Dept: HOSPITAL 47 - FBPOP | Age: 31
Setting detail: OBSERVATION
LOS: 1 days | Discharge: HOME | End: 2021-02-20
Attending: OBSTETRICS & GYNECOLOGY | Admitting: OBSTETRICS & GYNECOLOGY
Payer: MEDICAID

## 2021-02-19 DIAGNOSIS — O24.419: ICD-10-CM

## 2021-02-19 DIAGNOSIS — Z87.442: ICD-10-CM

## 2021-02-19 DIAGNOSIS — O14.93: Primary | ICD-10-CM

## 2021-02-19 DIAGNOSIS — O26.899: ICD-10-CM

## 2021-02-19 DIAGNOSIS — R10.9: ICD-10-CM

## 2021-02-19 DIAGNOSIS — Z3A.36: ICD-10-CM

## 2021-02-19 LAB
ALT SERPL-CCNC: 17 U/L (ref 4–34)
APTT BLD: 20.9 SEC (ref 22–30)
AST SERPL-CCNC: 23 U/L (ref 14–36)
BASOPHILS # BLD AUTO: 0 K/UL (ref 0–0.2)
BASOPHILS NFR BLD AUTO: 0 %
BUN SERPL-SCNC: 9 MG/DL (ref 7–17)
EOSINOPHIL # BLD AUTO: 0 K/UL (ref 0–0.7)
EOSINOPHIL NFR BLD AUTO: 0 %
ERYTHROCYTE [DISTWIDTH] IN BLOOD BY AUTOMATED COUNT: 4.38 M/UL (ref 3.8–5.4)
ERYTHROCYTE [DISTWIDTH] IN BLOOD: 15.2 % (ref 11.5–15.5)
FIBRINOGEN PPP-MCNC: 723 MG/DL (ref 200–500)
GLUCOSE BLD-MCNC: 144 MG/DL (ref 75–99)
GLUCOSE BLD-MCNC: 78 MG/DL (ref 75–99)
GLUCOSE BLD-MCNC: 97 MG/DL (ref 75–99)
GLUCOSE UR QL: (no result)
HCT VFR BLD AUTO: 38.5 % (ref 34–46)
HGB BLD-MCNC: 12.6 GM/DL (ref 11.4–16)
INR PPP: 0.8 (ref ?–1.2)
LDH SPEC-CCNC: 455 U/L (ref 313–618)
LYMPHOCYTES # SPEC AUTO: 1.6 K/UL (ref 1–4.8)
LYMPHOCYTES NFR SPEC AUTO: 15 %
MCH RBC QN AUTO: 28.8 PG (ref 25–35)
MCHC RBC AUTO-ENTMCNC: 32.8 G/DL (ref 31–37)
MCV RBC AUTO: 87.8 FL (ref 80–100)
MONOCYTES # BLD AUTO: 0.5 K/UL (ref 0–1)
MONOCYTES NFR BLD AUTO: 5 %
NEUTROPHILS # BLD AUTO: 8.6 K/UL (ref 1.3–7.7)
NEUTROPHILS NFR BLD AUTO: 79 %
PH UR: 6 [PH] (ref 5–8)
PLATELET # BLD AUTO: 201 K/UL (ref 150–450)
PROT UR QL: (no result)
PROT/CREAT UR-RTO: 3.11
PT BLD: 9.3 SEC (ref 9–12)
SP GR UR: 1.02 (ref 1–1.03)
SQUAMOUS UR QL AUTO: 6 /HPF (ref 0–4)
URATE SERPL-MCNC: 4.4 MG/DL (ref 3.7–7.4)
UROBILINOGEN UR QL STRIP: <2 MG/DL (ref ?–2)
WBC # BLD AUTO: 10.9 K/UL (ref 3.8–10.6)
WBC #/AREA URNS HPF: 13 /HPF (ref 0–5)

## 2021-02-19 PROCEDURE — 84460 ALANINE AMINO (ALT) (SGPT): CPT

## 2021-02-19 PROCEDURE — 83615 LACTATE (LD) (LDH) ENZYME: CPT

## 2021-02-19 PROCEDURE — 84156 ASSAY OF PROTEIN URINE: CPT

## 2021-02-19 PROCEDURE — 84520 ASSAY OF UREA NITROGEN: CPT

## 2021-02-19 PROCEDURE — 85025 COMPLETE CBC W/AUTO DIFF WBC: CPT

## 2021-02-19 PROCEDURE — 82565 ASSAY OF CREATININE: CPT

## 2021-02-19 PROCEDURE — 84450 TRANSFERASE (AST) (SGOT): CPT

## 2021-02-19 PROCEDURE — 85730 THROMBOPLASTIN TIME PARTIAL: CPT

## 2021-02-19 PROCEDURE — 85610 PROTHROMBIN TIME: CPT

## 2021-02-19 PROCEDURE — 81001 URINALYSIS AUTO W/SCOPE: CPT

## 2021-02-19 PROCEDURE — 84550 ASSAY OF BLOOD/URIC ACID: CPT

## 2021-02-19 PROCEDURE — 85384 FIBRINOGEN ACTIVITY: CPT

## 2021-02-19 PROCEDURE — 82570 ASSAY OF URINE CREATININE: CPT

## 2021-02-19 PROCEDURE — 81050 URINALYSIS VOLUME MEASURE: CPT

## 2021-02-19 NOTE — P.HPOB
History of Present Illness


H&P Date: 21


Chief Complaint: Gestational hypertension





30-year-old  presents at 36 weeks to my office with elevated blood 

pressures.  Blood pressure was 140/90.  Center to triage for preeclamptic labs 

and serial blood pressures as well as an NST.  NST is reactive, blood pressures 

are 130s over 70s to 140s over 90s.  Her labs were mostly normal, fibrinogen was

elevated at 755 and the PC ratio was 3.  She denies any headache, nausea, vision

changes, shortness of breath or calf pain.  She's having good fetal movement.  

Right now I know she has gestational hypertension I would like to run a 24-hour 

urine protein and serial blood pressures.  She will stay for observation.





Review of Systems


All systems: negative


Constitutional: Denies chills, Denies fever


Eyes: denies blurred vision, denies pain


Ears, nose, mouth and throat: Denies headache, Denies sore throat


Cardiovascular: Denies chest pain, Denies shortness of breath


Respiratory: Denies cough


Gastrointestinal: Denies abdominal pain, Denies diarrhea, Denies nausea, Denies 

vomiting


Genitourinary: Denies dysuria, Denies hematuria


Musculoskeletal: Denies myalgias


Integumentary: Denies pruritus, Denies rash


Neurological: Denies numbness, Denies weakness


Psychiatric: Denies anxiety, Denies depression


Endocrine: Denies fatigue, Denies weight change





Past Medical History


Past Medical History: No Reported History


Additional Past Medical History / Comment(s): Pericarditis; Kidney Stones, 

fibroid cysts


History of Any Multi-Drug Resistant Organisms: None Reported


Past Surgical History: No Surgical Hx Reported


Smoking Status: Never smoker





Medications and Allergies


                                Home Medications











 Medication  Instructions  Recorded  Confirmed  Type


 


Pnv,Calcium 72/Iron/Folic Acid 1 tab PO DAILY 20 History





[Prenatal Plus Tablet]    








                                    Allergies











Allergy/AdvReac Type Severity Reaction Status Date / Time


 


No Known Allergies Allergy   Verified 21 14:27














Exam


Osteopathic Statement: *.  No significant issues noted on an osteopathic 

structural exam other than those noted in the History and Physical/Consult.


                                Intake and Output











 21





 06:59 14:59 22:59


 


Other:   


 


  Weight  114.305 kg 














Heart: Regular rate and rhythm


Lungs: Clear to auscultation bilaterally


Abdomen: Soft, nontender


Extremities: Negative Homans sign





Results


Result Diagrams: 


                                 21 14:54





                                 21 14:54


                  Abnormal Lab Results - Last 24 Hours (Table)











  21 Range/Units





  14:30 14:54 14:54 


 


WBC   10.9 H   (3.8-10.6)  k/uL


 


Neutrophils #   8.6 H   (1.3-7.7)  k/uL


 


APTT     (22.0-30.0)  sec


 


Fibrinogen     (200-500)  mg/dL


 


Creatinine    0.50 L  (0.52-1.04)  mg/dL


 


Urine Appearance  Cloudy H    (Clear)  


 


Urine Protein  3+ H    (Negative)  


 


Urine Glucose (UA)  1+ H    (Negative)  


 


Urine WBC  13 H    (0-5)  /hpf


 


Ur Squamous Epith Cells  6 H    (0-4)  /hpf


 


Urine Bacteria  Many H    (None)  /hpf


 


Urine Mucus  Few H    (None)  /hpf














  21 Range/Units





  14:54 


 


WBC   (3.8-10.6)  k/uL


 


Neutrophils #   (1.3-7.7)  k/uL


 


APTT  20.9 L  (22.0-30.0)  sec


 


Fibrinogen  723 H  (200-500)  mg/dL


 


Creatinine   (0.52-1.04)  mg/dL


 


Urine Appearance   (Clear)  


 


Urine Protein   (Negative)  


 


Urine Glucose (UA)   (Negative)  


 


Urine WBC   (0-5)  /hpf


 


Ur Squamous Epith Cells   (0-4)  /hpf


 


Urine Bacteria   (None)  /hpf


 


Urine Mucus   (None)  /hpf














Assessment and Plan


(1) Gestational hypertension


Current Visit: Yes   Status: Acute   Code(s): O13.9 - GESTATIONAL HTN W/O 

SIGNIFICANT PROTEINURIA, UNSP TRIMESTER   SNOMED Code(s): 215117270


   





(2) Gestational diabetes


Current Visit: Yes   Status: Acute   Code(s): O24.419 - GESTATIONAL DIABETES 

MELLITUS IN PREGNANCY, UNSP CONTROL   SNOMED Code(s): 10450179


   





(3) 36 weeks gestation of pregnancy


Current Visit: Yes   Status: Acute   Code(s): Z3A.36 - 36 WEEKS GESTATION OF 

PREGNANCY   SNOMED Code(s): 21409602


   


Plan: 





1.  Admit for observation


2.  Blood pressures every 4 hours while awake


3.  24 urine protein


4.  Monitor blood sugars

## 2021-02-20 VITALS — RESPIRATION RATE: 14 BRPM

## 2021-02-20 VITALS — TEMPERATURE: 98.1 F | HEART RATE: 97 BPM | DIASTOLIC BLOOD PRESSURE: 61 MMHG | SYSTOLIC BLOOD PRESSURE: 117 MMHG

## 2021-02-20 LAB
ALT SERPL-CCNC: 17 U/L (ref 4–34)
APTT BLD: 21.4 SEC (ref 22–30)
AST SERPL-CCNC: 24 U/L (ref 14–36)
BASOPHILS # BLD AUTO: 0 K/UL (ref 0–0.2)
BASOPHILS NFR BLD AUTO: 0 %
BUN SERPL-SCNC: 6 MG/DL (ref 7–17)
EOSINOPHIL # BLD AUTO: 0.1 K/UL (ref 0–0.7)
EOSINOPHIL NFR BLD AUTO: 1 %
ERYTHROCYTE [DISTWIDTH] IN BLOOD BY AUTOMATED COUNT: 4.27 M/UL (ref 3.8–5.4)
ERYTHROCYTE [DISTWIDTH] IN BLOOD: 15.1 % (ref 11.5–15.5)
FIBRINOGEN PPP-MCNC: 642 MG/DL (ref 200–500)
GLUCOSE BLD-MCNC: 103 MG/DL (ref 75–99)
GLUCOSE BLD-MCNC: 130 MG/DL (ref 75–99)
GLUCOSE BLD-MCNC: 135 MG/DL (ref 75–99)
GLUCOSE BLD-MCNC: 219 MG/DL (ref 75–99)
HCT VFR BLD AUTO: 37.7 % (ref 34–46)
HGB BLD-MCNC: 12.3 GM/DL (ref 11.4–16)
INR PPP: 0.8 (ref ?–1.2)
LDH SPEC-CCNC: 393 U/L (ref 313–618)
LYMPHOCYTES # SPEC AUTO: 2 K/UL (ref 1–4.8)
LYMPHOCYTES NFR SPEC AUTO: 21 %
MCH RBC QN AUTO: 28.9 PG (ref 25–35)
MCHC RBC AUTO-ENTMCNC: 32.7 G/DL (ref 31–37)
MCV RBC AUTO: 88.2 FL (ref 80–100)
MONOCYTES # BLD AUTO: 0.5 K/UL (ref 0–1)
MONOCYTES NFR BLD AUTO: 6 %
NEUTROPHILS # BLD AUTO: 6.8 K/UL (ref 1.3–7.7)
NEUTROPHILS NFR BLD AUTO: 71 %
PLATELET # BLD AUTO: 177 K/UL (ref 150–450)
PROT 24H UR-MRATE: 1224 MG/24HR (ref 42–225)
PT BLD: 9.3 SEC (ref 9–12)
SPECIMEN VOL 24H UR: 2400 MLS (ref 800–1800)
URATE SERPL-MCNC: 4.9 MG/DL (ref 3.7–7.4)
WBC # BLD AUTO: 9.6 K/UL (ref 3.8–10.6)

## 2021-02-20 NOTE — P.PN
Progress Note - Text


Progress Note Date: 02/20/21





Patient is seen and complains of occasional abdominal pain more in her upper 

abdomen.  She stated it lasted about 10 minutes but has gone away.  She denies 

any headaches or blurry vision.  She is doing a 24-hour urine collection today. 

Abdomen is soft and nontender currently.  Impression is pregnancy at 36 weeks, 

gestational hypertension, gestational diabetes.  Plan is to complete 24 hour 

urine collection today.  She is advised to let her nurse know if she does start 

to have the abdominal pain again and we will put her on the monitor to make sure

she is not having labor contractions.  She did have an elevated blood sugar 

after dinner of 140.  We'll continue to monitor sugars.  She is advised that if 

her blood pressures are not severe, the plan will be to discharge her home after

her 24-hour urine collection is completed and she will follow-up with Dr. Trammell

in the office to discuss probable induction after 37 weeks.

## 2021-02-21 NOTE — P.DS
Providers


Date of admission: 


21 17:28





Expected date of discharge: 21


Attending physician: 


Lyn Trammell





Primary care physician: 


Stated None





Hospital Course: 





This is a 30 y.o. female  1, para 0, at 36-1/7 weeks admitted by Dr. Trammell for serial BPs and 24 hour urine collection due to elevated BPs in the 

office.  Her initial BPs were 140s/90s, but lowered to 110s/60s during her 

observation.  She denied and headaches, blurred vision or epigastric pain.  She 

did have some elevated blood sugars, but was given pancakes and syrup for her 

breakfast.  She was counselled on diabetic diet by nursing staff.  24 hour urine

collection did have 1224 mg protein.


Patient Condition at Discharge: Stable





Plan - Discharge Summary


New Discharge Prescriptions: 


No Action


   Pnv,Calcium 72/Iron/Folic Acid [Prenatal Plus Tablet] 1 tab PO DAILY


Discharge Medication List





Pnv,Calcium 72/Iron/Folic Acid [Prenatal Plus Tablet] 1 tab PO DAILY 20 

[History]








Follow up Appointment(s)/Referral(s): 


Lyn Trammell DO [Doctor of Osteopathic Medicine] - 1-2 Days


Activity/Diet/Wound Care/Special Instructions: 


Patient instructed to follow up with Dr. Trammell on Mon. or Tues. so that she can

be scheduled for induction after 37 weeks due to her diagnosis of preeclampsia 

without severe features.  She was advised to return to the hospital for any 

decreased fetal movement, headaches, blurred vision, epigastric pain or any 

other concerns.  She is advised that she will need twice weekly NSTs and blood 

pressure checks in the office up until her induction.


Discharge Disposition: HOME SELF-CARE

## 2021-02-26 ENCOUNTER — HOSPITAL ENCOUNTER (INPATIENT)
Dept: HOSPITAL 47 - 4FBP | Age: 31
LOS: 2 days | Discharge: HOME | End: 2021-02-28
Attending: OBSTETRICS & GYNECOLOGY | Admitting: OBSTETRICS & GYNECOLOGY
Payer: MEDICAID

## 2021-02-26 VITALS — RESPIRATION RATE: 16 BRPM

## 2021-02-26 DIAGNOSIS — Z86.59: ICD-10-CM

## 2021-02-26 DIAGNOSIS — Z3A.37: ICD-10-CM

## 2021-02-26 DIAGNOSIS — Z87.442: ICD-10-CM

## 2021-02-26 DIAGNOSIS — Z86.79: ICD-10-CM

## 2021-02-26 DIAGNOSIS — Z79.899: ICD-10-CM

## 2021-02-26 DIAGNOSIS — Z67.21: ICD-10-CM

## 2021-02-26 DIAGNOSIS — O26.893: ICD-10-CM

## 2021-02-26 LAB
BASOPHILS # BLD AUTO: 0 K/UL (ref 0–0.2)
BASOPHILS NFR BLD AUTO: 0 %
EOSINOPHIL # BLD AUTO: 0.2 K/UL (ref 0–0.7)
EOSINOPHIL NFR BLD AUTO: 2 %
ERYTHROCYTE [DISTWIDTH] IN BLOOD BY AUTOMATED COUNT: 4.25 M/UL (ref 3.8–5.4)
ERYTHROCYTE [DISTWIDTH] IN BLOOD: 15.3 % (ref 11.5–15.5)
GLUCOSE BLD-MCNC: 68 MG/DL (ref 75–99)
GLUCOSE BLD-MCNC: 87 MG/DL (ref 75–99)
HCT VFR BLD AUTO: 37.4 % (ref 34–46)
HGB BLD-MCNC: 12.2 GM/DL (ref 11.4–16)
LYMPHOCYTES # SPEC AUTO: 2.5 K/UL (ref 1–4.8)
LYMPHOCYTES NFR SPEC AUTO: 22 %
MCH RBC QN AUTO: 28.8 PG (ref 25–35)
MCHC RBC AUTO-ENTMCNC: 32.7 G/DL (ref 31–37)
MCV RBC AUTO: 88.1 FL (ref 80–100)
MONOCYTES # BLD AUTO: 0.6 K/UL (ref 0–1)
MONOCYTES NFR BLD AUTO: 5 %
NEUTROPHILS # BLD AUTO: 8 K/UL (ref 1.3–7.7)
NEUTROPHILS NFR BLD AUTO: 70 %
PLATELET # BLD AUTO: 180 K/UL (ref 150–450)
WBC # BLD AUTO: 11.4 K/UL (ref 3.8–10.6)

## 2021-02-26 PROCEDURE — 10907ZC DRAINAGE OF AMNIOTIC FLUID, THERAPEUTIC FROM PRODUCTS OF CONCEPTION, VIA NATURAL OR ARTIFICIAL OPENING: ICD-10-PCS

## 2021-02-26 PROCEDURE — 3E0234Z INTRODUCTION OF SERUM, TOXOID AND VACCINE INTO MUSCLE, PERCUTANEOUS APPROACH: ICD-10-PCS

## 2021-02-26 PROCEDURE — 85025 COMPLETE CBC W/AUTO DIFF WBC: CPT

## 2021-02-26 PROCEDURE — 86900 BLOOD TYPING SEROLOGIC ABO: CPT

## 2021-02-26 PROCEDURE — 3E033VJ INTRODUCTION OF OTHER HORMONE INTO PERIPHERAL VEIN, PERCUTANEOUS APPROACH: ICD-10-PCS

## 2021-02-26 PROCEDURE — 85461 HEMOGLOBIN FETAL: CPT

## 2021-02-26 PROCEDURE — 3E0R3BZ INTRODUCTION OF ANESTHETIC AGENT INTO SPINAL CANAL, PERCUTANEOUS APPROACH: ICD-10-PCS

## 2021-02-26 PROCEDURE — 0KQM0ZZ REPAIR PERINEUM MUSCLE, OPEN APPROACH: ICD-10-PCS

## 2021-02-26 PROCEDURE — 88307 TISSUE EXAM BY PATHOLOGIST: CPT

## 2021-02-26 PROCEDURE — 00HU33Z INSERTION OF INFUSION DEVICE INTO SPINAL CANAL, PERCUTANEOUS APPROACH: ICD-10-PCS

## 2021-02-26 PROCEDURE — 86901 BLOOD TYPING SEROLOGIC RH(D): CPT

## 2021-02-26 PROCEDURE — 86850 RBC ANTIBODY SCREEN: CPT

## 2021-02-26 RX ADMIN — POTASSIUM CHLORIDE SCH MLS/HR: 14.9 INJECTION, SOLUTION INTRAVENOUS at 07:53

## 2021-02-26 RX ADMIN — AMPICILLIN SODIUM SCH MLS/HR: 1 INJECTION, POWDER, FOR SOLUTION INTRAMUSCULAR; INTRAVENOUS at 11:47

## 2021-02-26 RX ADMIN — AMPICILLIN SODIUM SCH MLS/HR: 1 INJECTION, POWDER, FOR SOLUTION INTRAMUSCULAR; INTRAVENOUS at 15:54

## 2021-02-26 RX ADMIN — IBUPROFEN PRN MG: 600 TABLET ORAL at 22:11

## 2021-02-26 RX ADMIN — LABETALOL HCL SCH MG: 200 TABLET, FILM COATED ORAL at 21:02

## 2021-02-26 RX ADMIN — POTASSIUM CHLORIDE SCH MLS/HR: 14.9 INJECTION, SOLUTION INTRAVENOUS at 13:03

## 2021-02-26 RX ADMIN — AMPICILLIN SODIUM SCH: 1 INJECTION, POWDER, FOR SOLUTION INTRAMUSCULAR; INTRAVENOUS at 22:53

## 2021-02-26 NOTE — P.PROBDLV
Vaginal Delivery Note





- .


Vaginal Delivery Note: 





30-year-old  presents at 37 weeks and 1 day for induction of labor.  Her 

cervix is 1-2 cm dilated, 80% effaced, -2 station.  She is dick 

irregularly.  Fetal heart tones are 130 with moderate variability and reactive. 

She has had a few high blood pressures in my office and in triage.  Her 24 urine

protein showed 1.2 g.  She denies headache, nausea, vomiting, chest pain, 

shortness of breath or any vision changes.  Pitocin was started.  Amniotomy 

performed at 7:49 AM clear fluid noted.  When she was uncomfortable she did get 

an epidural at 1322 PM.  At this point she was 3 cm dilated, 90% effaced, and -2

station.  Her cervix was completely dilated at 1814.  She pushed the baby to a 

 on the perineum.  The patient was complaining of fatigue and requested 

assistance.  I explained the vacuum-assisted vaginal delivery without the risks 

and benefits, patient agreed to this procedure.  Ensure that the bladder was 

empty and verified position of the fetal head.  Placed the vacuum carefully.  

With one push and no pop offs infant's head delivered, the vacuum was removed 

after pressure was released.  anterior shoulder delivered with gentle downward 

guidance followed by posterior shoulder and rest of body.  Nose and mouth bulb 

suctioned, cord clamped and cut, infant placed on mother's abdomen.  Apgars 7, 

9, weight 8 lbs. 12 oz.  Placenta delivered spontaneously, intact with three-

vessel cord at 1945.  Vagina, cervix, perineum inspected.  Second-degree midline

laceration was repaired with 3-0 Vicryl.  Estimated blood loss 200 mL.  Mother 

and baby in stable condition.

## 2021-02-26 NOTE — P.HPOB
History of Present Illness


H&P Date: 21


Chief Complaint: Mild preeclampsia





30-year-old  presents at 37 weeks and 1 day for induction of labor.  Her 

cervix is 1-2 cm dilated, 80% effaced, -2 station.  She is dick 

irregularly.  Fetal heart tones are 1:30 with moderate variability and reactive.

 She has had a few high blood pressures in my office and in triage.  Her 24 

urine protein showed 1.2 g.  She denies headache, nausea, vomiting, chest pain, 

shortness of breath or any vision changes.  





Review of Systems


All systems: negative


Constitutional: Denies chills, Denies fever


Eyes: denies blurred vision, denies pain


Ears, nose, mouth and throat: Denies headache, Denies sore throat


Cardiovascular: Denies chest pain, Denies shortness of breath


Respiratory: Denies cough


Gastrointestinal: Denies abdominal pain, Denies diarrhea, Denies nausea, Denies 

vomiting


Genitourinary: Denies dysuria, Denies hematuria


Musculoskeletal: Denies myalgias


Integumentary: Denies pruritus, Denies rash


Neurological: Denies numbness, Denies weakness


Psychiatric: Denies anxiety, Denies depression


Endocrine: Denies fatigue, Denies weight change





Past Medical History


Past Medical History: No Reported History


Additional Past Medical History / Comment(s): Pericarditis; Kidney Stones, 

fibroid cysts


History of Any Multi-Drug Resistant Organisms: None Reported


Past Surgical History: No Surgical Hx Reported


Past Anesthesia/Blood Transfusion Reactions: No Reported Reaction


Past Psychological History: Anxiety, Depression


Smoking Status: Never smoker


Past Alcohol Use History: None Reported


Past Drug Use History: None Reported





- Past Family History


  ** Mother


Family Medical History: No Reported History





Medications and Allergies


                                Home Medications











 Medication  Instructions  Recorded  Confirmed  Type


 


Pnv,Calcium 72/Iron/Folic Acid 1 tab PO DAILY 20 History





[Prenatal Plus Tablet]    








                                    Allergies











Allergy/AdvReac Type Severity Reaction Status Date / Time


 


No Known Allergies Allergy   Verified 21 05:59














Exam


Osteopathic Statement: *.  No significant issues noted on an osteopathic 

structural exam other than those noted in the History and Physical/Consult.


                                   Vital Signs











  Temp Pulse Resp BP Pulse Ox


 


 21 05:57  97.7 F  130 H  16  124/91  99








                                Intake and Output











 21





 22:59 06:59 14:59


 


Other:   


 


  Weight  116.573 kg 














Heart: Regular rate and rhythm


Lungs: Clear to auscultation bilaterally


Abdomen: Soft, nontender


Extremities: Negative Homans sign





Results


Result Diagrams: 


                                 21 06:20





                  Abnormal Lab Results - Last 24 Hours (Table)











  21 Range/Units





  06:20 


 


WBC  11.4 H  (3.8-10.6)  k/uL


 


Neutrophils #  8.0 H  (1.3-7.7)  k/uL














Assessment and Plan


(1) Mild preeclampsia


Current Visit: Yes   Status: Acute   Code(s): O14.00 - MILD TO MODERATE PRE-

ECLAMPSIA, UNSPECIFIED TRIMESTER   SNOMED Code(s): 97528513


   


Plan: 





1.  Induction of labor with amniotomy and Pitocin


2.  Ampicillin for GBS prophylaxis


3.  Anticipate normal vaginal delivery

## 2021-02-27 LAB
BASOPHILS # BLD AUTO: 0 K/UL (ref 0–0.2)
BASOPHILS NFR BLD AUTO: 0 %
EOSINOPHIL # BLD AUTO: 0 K/UL (ref 0–0.7)
EOSINOPHIL NFR BLD AUTO: 0 %
ERYTHROCYTE [DISTWIDTH] IN BLOOD BY AUTOMATED COUNT: 3.74 M/UL (ref 3.8–5.4)
ERYTHROCYTE [DISTWIDTH] IN BLOOD: 15.2 % (ref 11.5–15.5)
HCT VFR BLD AUTO: 32.7 % (ref 34–46)
HGB BLD-MCNC: 10.8 GM/DL (ref 11.4–16)
LYMPHOCYTES # SPEC AUTO: 2.1 K/UL (ref 1–4.8)
LYMPHOCYTES NFR SPEC AUTO: 14 %
MCH RBC QN AUTO: 28.9 PG (ref 25–35)
MCHC RBC AUTO-ENTMCNC: 33 G/DL (ref 31–37)
MCV RBC AUTO: 87.5 FL (ref 80–100)
MONOCYTES # BLD AUTO: 0.9 K/UL (ref 0–1)
MONOCYTES NFR BLD AUTO: 7 %
NEUTROPHILS # BLD AUTO: 11.1 K/UL (ref 1.3–7.7)
NEUTROPHILS NFR BLD AUTO: 77 %
PLATELET # BLD AUTO: 164 K/UL (ref 150–450)
WBC # BLD AUTO: 14.4 K/UL (ref 3.8–10.6)

## 2021-02-27 RX ADMIN — DOCUSATE SODIUM AND SENNOSIDES SCH EACH: 50; 8.6 TABLET ORAL at 08:02

## 2021-02-27 RX ADMIN — LABETALOL HCL SCH MG: 200 TABLET, FILM COATED ORAL at 08:02

## 2021-02-27 RX ADMIN — LABETALOL HCL SCH MG: 200 TABLET, FILM COATED ORAL at 21:20

## 2021-02-27 RX ADMIN — IBUPROFEN PRN MG: 600 TABLET ORAL at 10:09

## 2021-02-27 RX ADMIN — IBUPROFEN PRN MG: 600 TABLET ORAL at 15:47

## 2021-02-27 RX ADMIN — IBUPROFEN PRN MG: 600 TABLET ORAL at 03:44

## 2021-02-27 RX ADMIN — DOCUSATE SODIUM AND SENNOSIDES SCH: 50; 8.6 TABLET ORAL at 21:57

## 2021-02-27 NOTE — P.PNOBGVD
Subjective





- Subjective


Principal diagnosis: S/P NVD PPD #1


Interval history: 





Patient seen and examined.  Denies nausea, vomiting, chest pain, shortness of 

breath or calf pain.  


Patient reports: Reports appetite normal, Reports voiding normally, Reports pain

well controlled, Reports ambulating normally





Objective





- Latest Vital Signs


Latest vital signs: 


                                   Vital Signs











  Temp Pulse Resp BP Pulse Ox


 


 02/27/21 08:00  97.7 F  92  16  150/90  98


 


 02/27/21 04:00  98.2 F  90  16  155/93 


 


 02/27/21 00:00  98.5 F  115 H  16  115/71 


 


 02/26/21 21:50  98.8 F  117 H  16  158/84 


 


 02/26/21 21:20   106 H  16  165/84 


 


 02/26/21 20:50   106 H  16  172/79 


 


 02/26/21 20:35   115 H  16  170/73 


 


 02/26/21 20:20   118 H  16  157/72 


 


 02/26/21 20:05   121 H  16  156/65 


 


 02/26/21 19:50  98.9 F  129 H  16  159/74 








                                Intake and Output











 02/26/21 02/27/21 02/27/21





 22:59 06:59 14:59


 


Other:   


 


  # Voids 1 1 














- Exam


Lungs: bilateral: normal


Chest: Normal S1, Normal S2


Extremities: Present: normal


Abdomen: Present: normal appearance, soft


Uterus: Present: normal, firm





- Labs


Labs: 


                  Abnormal Lab Results - Last 24 Hours (Table)











  02/26/21 02/27/21 Range/Units





  18:05 07:15 


 


WBC   14.4 H  (3.8-10.6)  k/uL


 


RBC   3.74 L  (3.80-5.40)  m/uL


 


Hgb   10.8 L  (11.4-16.0)  gm/dL


 


Hct   32.7 L  (34.0-46.0)  %


 


Neutrophils #   11.1 H  (1.3-7.7)  k/uL


 


POC Glucose (mg/dL)  68 L   (75-99)  mg/dL














Assessment and Plan


(1) Mild preeclampsia


Current Visit: Yes   Status: Acute   Code(s): O14.00 - MILD TO MODERATE PRE-

ECLAMPSIA, UNSPECIFIED TRIMESTER   SNOMED Code(s): 13377418


   





(2) Status post normal vaginal delivery


Current Visit: Yes   Status: Acute   Code(s): PNB1579 -    SNOMED Code(s): 

864767167


   


Plan: 





1.  Patient's blood pressures were more elevated after delivery.  I started her 

on labetalol 200 mg twice a day which is controlling this.


2.  Continue postpartum care

## 2021-02-28 VITALS — DIASTOLIC BLOOD PRESSURE: 72 MMHG | TEMPERATURE: 98.4 F | SYSTOLIC BLOOD PRESSURE: 119 MMHG | HEART RATE: 71 BPM

## 2021-02-28 RX ADMIN — LABETALOL HCL SCH: 200 TABLET, FILM COATED ORAL at 09:20

## 2021-02-28 RX ADMIN — DOCUSATE SODIUM AND SENNOSIDES SCH: 50; 8.6 TABLET ORAL at 09:20

## 2021-02-28 RX ADMIN — IBUPROFEN PRN MG: 600 TABLET ORAL at 00:14

## 2021-02-28 RX ADMIN — IBUPROFEN PRN MG: 600 TABLET ORAL at 08:37

## 2021-02-28 NOTE — P.DS
Providers


Date of admission: 


02/26/21 05:50





Expected date of discharge: 02/28/21


Attending physician: 


Lyn Trammell





Primary care physician: 


Stated None








- Discharge Diagnosis(es)


(1) Mild preeclampsia


Current Visit: Yes   Status: Acute   





(2) Status post normal vaginal delivery


Current Visit: Yes   Status: Acute   


Hospital Course: 





Pt presented for induction of labor due to mild preeclampsia.  She underwent a 

normal vaginal delivery.  Post partum her blood pressures remained 150s over 90s

160s over 90s and I started her on labetalol 200 mg twice a day.  Postpartum day

2 her blood pressures are 120s over 70s.  I like to drop her labetalol down to 

100 mg twice a day for discharge.  When I see her in 1 week to check her blood 

pressure in the office.  She is advised to check her pressures at home as well. 

All symptoms of preeclampsia were reviewed with the patient.  Currently she 

denies headache, vision changes, right upper quadrant pain, epigastric pain, 

nausea, vomiting, fever, chills, chest pain or shortness of breath.  She will be

discharged home postpartum day #2 in stable condition to follow-up with me in 

one week.





Plan - Discharge Summary


New Discharge Prescriptions: 


New


   Ibuprofen [Motrin] 600 mg PO Q6HR PRN #30 tab


     PRN Reason: Mild Pain Or Fever >= 100.5


   Labetalol [Trandate] 100 mg PO BID #60 tablet





No Action


   Pnv,Calcium 72/Iron/Folic Acid [Prenatal Plus Tablet] 1 tab PO DAILY


Discharge Medication List





Pnv,Calcium 72/Iron/Folic Acid [Prenatal Plus Tablet] 1 tab PO DAILY 11/23/20 

[History]


Ibuprofen [Motrin] 600 mg PO Q6HR PRN #30 tab 02/28/21 [Rx]


Labetalol [Trandate] 100 mg PO BID #60 tablet 02/28/21 [Rx]








Follow up Appointment(s)/Referral(s): 


Lyn Trammell DO [Doctor of Osteopathic Medicine] - 1 Week


Patient Instructions/Handouts:  Vaginal Delivery (DC)


Discharge Disposition: HOME SELF-CARE

## 2021-12-15 ENCOUNTER — HOSPITAL ENCOUNTER (EMERGENCY)
Dept: HOSPITAL 47 - EC | Age: 31
Discharge: HOME | End: 2021-12-15
Payer: MEDICAID

## 2021-12-15 VITALS — HEART RATE: 100 BPM | SYSTOLIC BLOOD PRESSURE: 145 MMHG | DIASTOLIC BLOOD PRESSURE: 90 MMHG | TEMPERATURE: 98.6 F

## 2021-12-15 VITALS — RESPIRATION RATE: 18 BRPM

## 2021-12-15 DIAGNOSIS — B34.9: Primary | ICD-10-CM

## 2021-12-15 DIAGNOSIS — Z87.891: ICD-10-CM

## 2021-12-15 DIAGNOSIS — Z20.822: ICD-10-CM

## 2021-12-15 DIAGNOSIS — B08.4: ICD-10-CM

## 2021-12-15 PROCEDURE — 87081 CULTURE SCREEN ONLY: CPT

## 2021-12-15 PROCEDURE — 87430 STREP A AG IA: CPT

## 2021-12-15 PROCEDURE — 87635 SARS-COV-2 COVID-19 AMP PRB: CPT

## 2021-12-15 PROCEDURE — 99284 EMERGENCY DEPT VISIT MOD MDM: CPT

## 2021-12-15 NOTE — ED
General Adult HPI





- General


Chief complaint: Upper Respiratory Infection


Stated complaint: Sore throat, body aches


Time Seen by Provider: 12/15/21 09:35


Source: patient, RN notes reviewed


Mode of arrival: ambulatory


Limitations: no limitations





- History of Present Illness


Initial comments: 


This a 31-year-old female presents emergency from chief complaint of fever 

headache bodyaches sore throat.  Patient states that her daughter is also sick 

with rash and fever.  She denies any known sick contacts otherwise.  Denies any 

significant shortness breath chest pain patient denies nausea vomiting diarrhea 

constipation.  She is very painful to swallow and had a diffuse headache with 

body aches.








- Related Data


                                Home Medications











 Medication  Instructions  Recorded  Confirmed


 


No Known Home Medications  12/15/21 12/15/21











                                    Allergies











Allergy/AdvReac Type Severity Reaction Status Date / Time


 


No Known Allergies Allergy   Verified 12/15/21 10:53














Review of Systems


ROS Statement: 


Those systems with pertinent positive or pertinent negative responses have been 

documented in the HPI.





ROS Other: All systems not noted in ROS Statement are negative.





Past Medical History


Past Medical History: No Reported History


Additional Past Medical History / Comment(s): Pericarditis; Kidney Stones, 

fibroid cysts


History of Any Multi-Drug Resistant Organisms: None Reported


Past Surgical History: No Surgical Hx Reported


Past Anesthesia/Blood Transfusion Reactions: No Reported Reaction


Past Psychological History: Anxiety, Depression


Smoking Status: Former smoker


Past Alcohol Use History: None Reported


Past Drug Use History: None Reported





- Past Family History


  ** Mother


Family Medical History: No Reported History





General Exam


Limitations: no limitations


General appearance: alert, in no apparent distress


Head exam: Present: atraumatic, normocephalic, normal inspection


Eye exam: Present: normal appearance, PERRL, EOMI.  Absent: scleral icterus, 

conjunctival injection, periorbital swelling


ENT exam: Present: mucous membranes moist.  Absent: normal exam, normal 

oropharynx (Erythematous swollen tonsils)


Neck exam: Present: normal inspection, full ROM.  Absent: tenderness, m

eningismus, lymphadenopathy


Respiratory exam: Present: normal lung sounds bilaterally.  Absent: respiratory 

distress, wheezes, rales, rhonchi, stridor


Cardiovascular Exam: Present: normal rhythm, tachycardia, normal heart sounds.  

Absent: systolic murmur, diastolic murmur, rubs, gallop, clicks





Course


                                   Vital Signs











  12/15/21





  09:22


 


Temperature 100.5 F H


 


Pulse Rate 113 H


 


Respiratory 18





Rate 


 


Blood Pressure 150/93


 


O2 Sat by Pulse 100





Oximetry 














Medical Decision Making





- Medical Decision Making


Patient noted a fever daughter currently has hand-foot-and-mouth patient does 

have erythematous posterior pharynx most likely related to viral infection.  

Patient will be discharged in stable condition return parameters were discussed.








- Lab Data


                                   Lab Results











  12/15/21 12/15/21 Range/Units





  10:16 10:16 


 


Coronavirus (PCR)   Not Detected  (Not Detectd)  


 


Group A Strep Rapid  Negative   (Negative)  














Disposition


Clinical Impression: 


 Viral infection, Hand, foot and mouth disease





Disposition: HOME SELF-CARE


Condition: Stable


Instructions (If sedation given, give patient instructions):  Hand, Foot, and 

Mouth Disease (ED)


Additional Instructions: 


Please return to the Emergency Department if symptoms worsen or any other 

concerns.


Is patient prescribed a controlled substance at d/c from ED?: No


Referrals: 


Bennett Saucedo MD [Primary Care Provider] - 1-2 days


Time of Disposition: 10:57